# Patient Record
Sex: FEMALE | Race: WHITE | NOT HISPANIC OR LATINO | Employment: FULL TIME | URBAN - METROPOLITAN AREA
[De-identification: names, ages, dates, MRNs, and addresses within clinical notes are randomized per-mention and may not be internally consistent; named-entity substitution may affect disease eponyms.]

---

## 2017-01-15 ENCOUNTER — LAB CONVERSION - ENCOUNTER (OUTPATIENT)
Dept: OTHER | Facility: OTHER | Age: 27
End: 2017-01-15

## 2017-01-15 LAB — TSH SERPL DL<=0.05 MIU/L-ACNC: 1.71 MIU/L

## 2017-01-17 ENCOUNTER — ALLSCRIPTS OFFICE VISIT (OUTPATIENT)
Dept: OTHER | Facility: OTHER | Age: 27
End: 2017-01-17

## 2017-04-11 ENCOUNTER — ALLSCRIPTS OFFICE VISIT (OUTPATIENT)
Dept: OTHER | Facility: OTHER | Age: 27
End: 2017-04-11

## 2017-05-09 ENCOUNTER — ALLSCRIPTS OFFICE VISIT (OUTPATIENT)
Dept: OTHER | Facility: OTHER | Age: 27
End: 2017-05-09

## 2017-09-12 ENCOUNTER — ALLSCRIPTS OFFICE VISIT (OUTPATIENT)
Dept: OTHER | Facility: OTHER | Age: 27
End: 2017-09-12

## 2017-10-26 NOTE — PROGRESS NOTES
Assessment  1  Anxiety (300 00) (F41 9)   2  Upper respiratory infection (465 9) (J06 9)    Plan  Upper respiratory infection    · Cefuroxime Axetil 500 MG Oral Tablet; Take 1 tablet twice daily    Discussion/Summary    #1  Anxiety? improved on the 1 5 of zoloft daily  Continue and recheck in 4 months  upper respiratory infection with likely early sinusitis- ceftin 500 mg one twice daily for 10 days and continue OTC meds and increase in fluids  Chief Complaint  2 month follow up to anxiety and medication check  c/o chest and sinus congestion x 1 wk  kck      History of Present Illness  patient here today for her 2 month recheck of Zoloft use for her anxiety  he states that the increase to 1-1/2 tablets of Zoloft once daily has definitely made a difference in her anxiety level overall  She states that on occasion she'll take 2 instead of one she had a bad day or she no she is going out of the day depending on what her outlook schedule is like  She states that she is happy where se is now with thjority of 1-1/2 daily  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 and bacteria  She states that everyone at her workplace is sick and for the past week she has had a lot of head congestion which now is moving into her chest and she is having a nasal and productive cough with secretions of clear and yellow mucus  No fevers  She has been using DayQuil and NyQuil over-the-counter  She states that her throat is sore and that her ears are beginning to ache  She does get frequent ear infections  Review of Systems    Constitutional: feeling tired, but-- as noted in HPI  Eyes: No complaints of eye pain, no red eyes, no eyesight problems, no discharge, no dry eyes, no itching of eyes  ENT: earache,-- sore throat-- and-- nasal discharge, but-- as noted in HPI     Cardiovascular: No complaints of slow heart rate, no fast heart rate, no chest pain, no palpitations, no leg claudication, no lower extremity edema  Respiratory: cough-- and-- PND, but-- as noted in HPI  Gastrointestinal: No complaints of abdominal pain, no constipation, no nausea or vomiting, no diarrhea, no bloody stools  Genitourinary: No complaints of dysuria, no incontinence, no pelvic pain, no dysmenorrhea, no vaginal discharge or bleeding  Musculoskeletal: No complaints of arthralgias, no myalgias, no joint swelling or stiffness, no limb pain or swelling  Integumentary: No complaints of skin rash or lesions, no itching, no skin wounds, no breast pain or lump  Neurological: No complaints of headache, no confusion, no convulsions, no numbness, no dizziness or fainting, no tingling, no limb weakness, no difficulty walking  Psychiatric: Not suicidal, no sleep disturbance, no anxiety or depression, no change in personality, no emotional problems  Endocrine: No complaints of proptosis, no hot flashes, no muscle weakness, no deepening of the voice, no feelings of weakness  Hematologic/Lymphatic: No complaints of swollen glands, no swollen glands in the neck, does not bleed easily, does not bruise easily  ROS reviewed  Active Problems  1  Anxiety (300 00) (F41 9)   2  Birth Control Method - Oral Contraceptives    Past Medical History    The active problems and past medical history were reviewed and updated today  Surgical History    The surgical history was reviewed and updated today  Family History  Mother    1  Family history of hypertension (V17 49) (Z82 49)  Grandmother    2  Family history of malignant neoplasm of brain (V16 8) (Z80 8)  Uncle    3  Family history of Cancer, colon  Family History    4  No family history of mental disorder    The family history was reviewed and updated today         Social History   · Activities: Movies   · Activities: Reading   · Birth Control Method - Oral Contraceptives   · Employed   · Exercise: Walking   · Never a smoker   · No secondhand smoke exposure (V43 89) (Z78 9)   · Single  The social history was reviewed and updated today  Current Meds   1  Cryselle-28 0 3-30 MG-MCG Oral Tablet; take 1 tablet every day; Therapy: [id number] to (Ples [place])  Requested for: 07Tim8929; Last   Rx:[id number]; Status: ACTIVE - Renewal Denied Ordered   2  Flonase 50 MCG/ACT SUSP; USE 2 SPRAYS IN EACH NOSTRIL ONCE DAILY; Therapy: 44Uxp8809 to (Last Rx:12Apr2012)  Requested for: 88Rev0771 Ordered   3  ProAir  (90 Base) MCG/ACT Inhalation Aerosol Solution; Therapy: 73ZRK8801 to Recorded   4  Sertraline HCl - 25 MG Oral Tablet; take 1 5 tablets by mouth once daily; Therapy: 78Isf8136 to (Evaluate:12Oct2017)  Requested for: 99IUK3187; Last   Rx:[id number] Ordered    The medication list was reviewed and updated today  Allergies  1  Sudafed   2  Rondec LIQD    Vitals  Vital Signs    Recorded: 12Sep2017 05:59PM   Heart Rate 64   Systolic 021, LUE, Sitting   Diastolic 72, LUE, Sitting   Height 5 ft 2 5 in   Weight 344 lb 4 oz   BMI Calculated 61 96   BSA Calculated 2 42     Physical Exam    Constitutional   General appearance: No acute distress, well appearing and well nourished  Eyes   Conjunctiva and lids: No swelling, erythema or discharge  Ears, Nose, Mouth, and Throat   External inspection of ears and nose: Normal     Otoscopic examination: Tympanic membranes translucent with normal light reflex  Canals patent without erythema  Nasal mucosa, septum, and turbinates: Abnormal  -- edematous erythematous bilateral turbinate  Oropharynx: Abnormal  -- ild posterior pharynx erythema without exudate  Pulmonary   Respiratory effort: No increased work of breathing or signs of respiratory distress  Auscultation of lungs: Clear to auscultation  Cardiovascular   Auscultation of heart: Normal rate and rhythm, normal S1 and S2, without murmurs  Lymphatic   Palpation of lymph nodes in neck: No lymphadenopathy   -- tender bilateral anterior cervical nodes without lymphadenopathy     Musculoskeletal   Gait and station: Normal     Psychiatric   Mood and affect: Normal          Future Appointments    Date/Time Provider Specialty Site   01/16/2018 06:30 PM Jade Del Valle [facility]     Signatures   Electronically signed by : Alyssa Byrne [facility]; Sep 12 2017  6:17PM EST                       (Author)    Electronically signed by : Gavin Fink MD; Sep 14 2017  9:13AM EST                       (Author)

## 2018-01-12 VITALS
SYSTOLIC BLOOD PRESSURE: 128 MMHG | BODY MASS INDEX: 51.91 KG/M2 | HEIGHT: 63 IN | WEIGHT: 293 LBS | DIASTOLIC BLOOD PRESSURE: 70 MMHG | HEART RATE: 60 BPM

## 2018-01-13 VITALS
DIASTOLIC BLOOD PRESSURE: 70 MMHG | BODY MASS INDEX: 51.91 KG/M2 | WEIGHT: 293 LBS | HEART RATE: 64 BPM | SYSTOLIC BLOOD PRESSURE: 138 MMHG | HEIGHT: 63 IN

## 2018-01-13 VITALS
BODY MASS INDEX: 51.91 KG/M2 | WEIGHT: 293 LBS | HEIGHT: 63 IN | HEART RATE: 68 BPM | SYSTOLIC BLOOD PRESSURE: 142 MMHG | DIASTOLIC BLOOD PRESSURE: 84 MMHG

## 2018-01-13 VITALS
SYSTOLIC BLOOD PRESSURE: 124 MMHG | HEIGHT: 63 IN | HEART RATE: 64 BPM | DIASTOLIC BLOOD PRESSURE: 72 MMHG | WEIGHT: 293 LBS | BODY MASS INDEX: 51.91 KG/M2

## 2018-02-01 ENCOUNTER — OFFICE VISIT (OUTPATIENT)
Dept: OBGYN CLINIC | Facility: MEDICAL CENTER | Age: 28
End: 2018-02-01
Payer: COMMERCIAL

## 2018-02-01 VITALS
HEIGHT: 62 IN | BODY MASS INDEX: 53.92 KG/M2 | DIASTOLIC BLOOD PRESSURE: 86 MMHG | WEIGHT: 293 LBS | SYSTOLIC BLOOD PRESSURE: 132 MMHG

## 2018-02-01 DIAGNOSIS — N91.4 SECONDARY OLIGOMENORRHEA: ICD-10-CM

## 2018-02-01 DIAGNOSIS — Z12.4 ENCOUNTER FOR PAPANICOLAOU SMEAR FOR CERVICAL CANCER SCREENING: ICD-10-CM

## 2018-02-01 DIAGNOSIS — Z01.419 WELL WOMAN EXAM WITH ROUTINE GYNECOLOGICAL EXAM: Primary | ICD-10-CM

## 2018-02-01 PROCEDURE — 99385 PREV VISIT NEW AGE 18-39: CPT | Performed by: OBSTETRICS & GYNECOLOGY

## 2018-02-01 RX ORDER — ALBUTEROL SULFATE 90 UG/1
AEROSOL, METERED RESPIRATORY (INHALATION)
COMMUNITY
Start: 2016-12-13 | End: 2019-03-05

## 2018-02-01 RX ORDER — SERTRALINE HYDROCHLORIDE 25 MG/1
TABLET, FILM COATED ORAL
Refills: 3 | COMMUNITY
Start: 2017-11-14 | End: 2018-02-27

## 2018-02-01 NOTE — PROGRESS NOTES
ASSESSMENT & PLAN: Diagnoses and all orders for this visit:    Encounter for Papanicolaou smear for cervical cancer screening  -     Thinprep Pap Reflex HPV mRNA E6/E7    Other orders  -     sertraline (ZOLOFT) 25 mg tablet; TAKE 1 5 TABLETS BY MOUTH ONCE DAILY  -     albuterol (PROAIR HFA) 90 mcg/act inhaler; Inhale  -     norgestrel-ethinyl estradiol (CRYSELLE-28) 0 3 mg-30 mcg per tablet; Take 1 tablet by mouth daily         1  Routine well woman exam done today  2  Pap:  The patient's pap is not up to date  Pap was today  Current ASCCP Guidelines reviewed  3   STD testing was was not  4   Patient is not sure she had her Gardasil vaccination  Recommendations reviewed  Will check with mother and pediatrician  5  The following were reviewed in today's visit:   6  F/u in 1 year for annual exam or PRN     CC:  Annual Gynecologic Examination    HPI: Carleen Caraballo is a 33 y o  who presents for annual gynecologic examination  She has the following concerns:  Irregular cycles, LMP October; was on Cryselle in the past    Last pap: 2 years years ago     There is no problem list on file for this patient  History reviewed  No pertinent past medical history  History reviewed  No pertinent surgical history  Past OB/Gyn History:  Pt has menstrual issues  Oligomenorrhea  History of sexually transmitted infection No  History of abnormal pap smears  No     Patient  currently sexually active  The current method of family planning is OCP (estrogen/progesterone)       Family History   Problem Relation Age of Onset    No Known Problems Mother     No Known Problems Father     Brain cancer Maternal Grandmother     Colon cancer Maternal Uncle        Social History:  Social History     Social History    Marital status: Single     Spouse name: N/A    Number of children: N/A    Years of education: N/A     Occupational History    Not on file       Social History Main Topics    Smoking status: Never Smoker    Smokeless tobacco: Never Used    Alcohol use No    Drug use: No    Sexual activity: No     Other Topics Concern    Not on file     Social History Narrative    No narrative on file       Allergies   Allergen Reactions    Pseudoephedrine Shortness Of Breath and Throat Swelling    Pseudoephedrine-Guaifenesin Er Hyperactivity         Current Outpatient Prescriptions:     sertraline (ZOLOFT) 25 mg tablet, TAKE 1 5 TABLETS BY MOUTH ONCE DAILY, Disp: , Rfl: 3    albuterol (PROAIR HFA) 90 mcg/act inhaler, Inhale, Disp: , Rfl:     norgestrel-ethinyl estradiol (CRYSELLE-28) 0 3 mg-30 mcg per tablet, Take 1 tablet by mouth daily, Disp: , Rfl:       Review of Systems  Constitutional :no fever, feels well, no tiredness, no recent weight gain or loss  ENT: no ear ache, no loss of hearing, no nosebleeds or nasal discharge, no sore throat or hoarseness  Cardiovascular: no complaints of slow or fast heart beat, no chest pain, no palpitations, no leg claudication or lower extremity edema  Respiratory: no complaints of shortness of shortness of breath, no PEREZ  Breasts:no complaints of breast pain, breast lump, or nipple discharge  Gastrointestinal: no complaints of abdominal pain, constipation,nausea, vomiting, or diarrhea or bloody stools  Genitourinary : no complaints of dysuria, incontinence, pelvic pain, no dysmenorrhea, vaginal discharge or abnormal vaginal bleeding and as noted in HPI    Integumentary: no complaints of skin rash or lesion, itching or dry skin  Neurological: no complaints of headache, no confusion, no numbness or tingling, no dizziness or fainting      Physical Exam:   /86   Ht 5' 2" (1 575 m)   Wt (!) 152 kg (335 lb)   LMP 10/18/2017   BMI 61 27 kg/m²     General:   appears stated age, cooperative, alert,normal mood and affect, obese   Neck: Neck: normal, supple,trachea midline, no masses   Heart: regular rate and rhythm, S1, S2 normal, no murmur, click, rub or gallop   Lungs: clear to auscultation bilaterally   Breasts: Breast exam :normal, no dimpling or skin changes noted   Abdomen: soft, non-tender, without masses or organomegaly   Vulva: Normal , no lesiona   Vagina: normal , no lesions or dryness   Urethra: normal   Cervix: Normal, no palpable masses, pap done today    Uterus: Normal , non-tender,not enlarged,no palpable masses   Adnexa: Normal, non-tender without fullness or masses

## 2018-02-02 LAB
CLINICAL INFO: NORMAL
CYTO CVX: NORMAL
DATE PREVIOUS BX: NORMAL
LMP START DATE: NORMAL
SL AMB PREV. PAP:: NORMAL
SPECIMEN SOURCE CVX/VAG CYTO: NORMAL

## 2018-02-27 ENCOUNTER — OFFICE VISIT (OUTPATIENT)
Dept: FAMILY MEDICINE CLINIC | Facility: CLINIC | Age: 28
End: 2018-02-27
Payer: COMMERCIAL

## 2018-02-27 VITALS
BODY MASS INDEX: 61.6 KG/M2 | DIASTOLIC BLOOD PRESSURE: 80 MMHG | SYSTOLIC BLOOD PRESSURE: 126 MMHG | HEART RATE: 68 BPM | WEIGHT: 293 LBS

## 2018-02-27 DIAGNOSIS — F41.9 ANXIETY: Primary | ICD-10-CM

## 2018-02-27 PROCEDURE — 99214 OFFICE O/P EST MOD 30 MIN: CPT | Performed by: PHYSICIAN ASSISTANT

## 2018-02-27 NOTE — PROGRESS NOTES
Assessment/Plan:    Anxiety  Improved on now 50 mg once daily  Continue and recheck 6 months  Diagnoses and all orders for this visit:    Anxiety  -     sertraline (ZOLOFT) 50 mg tablet; Take 1 tablet (50 mg total) by mouth daily for 90 days          Subjective:   CC: 4 month follow up for chronic conditions  No concerns noted  oscar     Patient ID: Panchito Joaquin is a 33 y o  female  Panchito Joaquin is here for chronic conditions f/u including the diagnosis of Anxiety  (primary encounter diagnosis)   Pt  states they are taking all medications as directed without complaints or side effects  Pt  had labs done prior to today's visit which included Recent Results (from the past 672 hour(s))  -Thinprep Pap Reflex HPV mRNA E6/E7  Collection Time: 02/01/18  5:47 PM       Result                      Value             Ref Range           SL AMB CLINICAL INFORM*     None given                            SL AMB LMP:                 NONE GIVEN                            SL AMB PREV  PAP:           NONE GIVEN                            SL AMB PREV  BX:            NONE GIVEN                            Source                      Cervix                                SL AMB STATEMENT OF AD*                                           SL AMB INTERPRETATION/*                                       Negative for intraepithelial lesion or malignancy  SL AMB CYTOTECHNOLOGIS*                                        Pt has been taking 50 mg since October and it is working better than taking the 1 5 of 25 mg  Needs refills  Overall doing wonderful on the Zoloft  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 and has no complaints today  The following portions of the patient's history were reviewed and updated as appropriate: allergies, current medications, past family history, past medical history, past social history, past surgical history and problem list     Review of Systems   Constitutional: Negative  HENT: Negative  Eyes: Negative  Respiratory: Negative  Cardiovascular: Negative  Gastrointestinal: Negative  Endocrine: Negative  Genitourinary: Negative  Musculoskeletal: Negative  Skin: Negative  Allergic/Immunologic: Negative  Neurological: Negative  Hematological: Negative  Psychiatric/Behavioral: Negative  Objective:      Vitals:    02/27/18 1848   BP: 126/80   BP Location: Left arm   Patient Position: Sitting   Pulse: 68   Weight: (!) 153 kg (336 lb 12 8 oz)            Physical Exam   Constitutional: She is oriented to person, place, and time  She appears well-developed and well-nourished  No distress  HENT:   Head: Normocephalic and atraumatic  Eyes: Conjunctivae are normal  Right eye exhibits no discharge  Left eye exhibits no discharge  Neck: Neck supple  Cardiovascular: Normal rate, regular rhythm and normal heart sounds  Exam reveals no gallop and no friction rub  No murmur heard  Pulmonary/Chest: Effort normal and breath sounds normal  No respiratory distress  She has no wheezes  She has no rales  Neurological: She is alert and oriented to person, place, and time  Skin: Skin is warm and dry  She is not diaphoretic  Psychiatric: She has a normal mood and affect  Judgment normal    Nursing note and vitals reviewed

## 2018-08-28 ENCOUNTER — OFFICE VISIT (OUTPATIENT)
Dept: FAMILY MEDICINE CLINIC | Facility: CLINIC | Age: 28
End: 2018-08-28
Payer: COMMERCIAL

## 2018-08-28 VITALS — HEART RATE: 92 BPM | DIASTOLIC BLOOD PRESSURE: 70 MMHG | SYSTOLIC BLOOD PRESSURE: 138 MMHG

## 2018-08-28 DIAGNOSIS — F41.9 ANXIETY: Primary | ICD-10-CM

## 2018-08-28 PROCEDURE — 1036F TOBACCO NON-USER: CPT | Performed by: PHYSICIAN ASSISTANT

## 2018-08-28 PROCEDURE — 99213 OFFICE O/P EST LOW 20 MIN: CPT | Performed by: PHYSICIAN ASSISTANT

## 2018-08-28 NOTE — PATIENT INSTRUCTIONS
Problem List Items Addressed This Visit        Other    Anxiety - Primary     Very stable on current zoloft dose  Will continue and pt is to call when she needs refills  Recheck in 6 months

## 2018-08-28 NOTE — ASSESSMENT & PLAN NOTE
Very stable on current zoloft dose  Will continue and pt is to call when she needs refills  Recheck in 6 months

## 2018-08-28 NOTE — PROGRESS NOTES
Assessment/Plan:    Anxiety  Very stable on current zoloft dose  Will continue and pt is to call when she needs refills  Recheck in 6 months  Diagnoses and all orders for this visit:    Anxiety          Subjective:   CC: 6 month follow up for chronic conditions  No concerns noted  Patient ID: Fallon Chaidez is a 33 y o  female  Fallon Chaidez is here for chronic conditions f/u including the diagnosis of Anxiety  (primary encounter diagnosis)   Pt  states they are taking all medications as directed without complaints or side effects  She thinks the zoloft 50 mg has been working great for the past year and does not feel she needs any adjustments at this time  She continues to work at [employer] which is picking up for the fall  She is in the process of moving where she lives and needs to be out by the end of the week and it is very hot out so she is stressed more than usual but feels the med is doing its job  No SI/HI  The following portions of the patient's history were reviewed and updated as appropriate: allergies, current medications, past family history, past medical history, past social history, past surgical history and problem list     Review of Systems   Constitutional: Negative  HENT: Negative  Eyes: Negative  Respiratory: Negative  Cardiovascular: Negative  Gastrointestinal: Negative  Endocrine: Negative  Genitourinary: Negative  Musculoskeletal: Negative  Skin: Negative  Allergic/Immunologic: Negative  Neurological: Negative  Hematological: Negative  Psychiatric/Behavioral: Negative  Objective:      Vitals:    08/28/18 1835   BP: 138/70   BP Location: Left arm   Patient Position: Sitting   Pulse: 92            Physical Exam   Constitutional: She is oriented to person, place, and time  She appears well-developed and well-nourished  No distress  HENT:   Head: Normocephalic and atraumatic     Eyes: Conjunctivae are normal  Right eye exhibits no discharge  Left eye exhibits no discharge  Neck: Neck supple  Carotid bruit is not present  Cardiovascular: Normal rate, regular rhythm and normal heart sounds  Exam reveals no gallop and no friction rub  No murmur heard  Pulmonary/Chest: Effort normal and breath sounds normal  No respiratory distress  She has no wheezes  She has no rales  Neurological: She is alert and oriented to person, place, and time  Skin: Skin is warm and dry  She is not diaphoretic  Psychiatric: She has a normal mood and affect  Judgment normal    Nursing note and vitals reviewed

## 2018-10-10 DIAGNOSIS — N91.4 SECONDARY OLIGOMENORRHEA: ICD-10-CM

## 2018-10-10 RX ORDER — NORGESTREL-ETHINYL ESTRADIOL 0.3-0.03MG
TABLET ORAL
Qty: 28 TABLET | Refills: 11 | Status: SHIPPED | OUTPATIENT
Start: 2018-10-10 | End: 2019-11-13 | Stop reason: SDUPTHER

## 2018-11-19 ENCOUNTER — TELEPHONE (OUTPATIENT)
Dept: FAMILY MEDICINE CLINIC | Facility: CLINIC | Age: 28
End: 2018-11-19

## 2018-11-19 NOTE — TELEPHONE ENCOUNTER
----- Message from [address] [place] [address]  Yunior sent at 11/19/2018 10:10 AM EST -----  Regarding: RE: Prescription Question  Contact: [phone]  I have enough tablets right now to work off of but if I do I can reach out  Thank you very much for your promptness!    ----- Message -----  From: Celeste Epley, PA-C  Sent: 11/19/18, 7:52 AM  To: [address] [place] [address]  Yunior  Subject: RE: Prescription Question    Good Morning,  That would be fine to do  Do you need me to refill with the new directions to give you 45 tabs at once? Carmen Calixto      ----- Message -----     From: [address] [place] [address]  Yunior     Sent: 11/17/2018  8:22 PM EST       To: Celeste Epley, PA-C  Subject: Prescription Question    Ms Chino Anderson was wondering if it would be ok to increase my current script for Zoloft from 50mg once daily to 1 5 tablets daily for a total of 75mg daily? Holiday season and busier work weeks have kicked in and I'm finding myself slightly more anxious than I have been  I didn't want to increase myself without your approval or if you know something else that could help me I would be appreciative  Any help with this matter would be greatly appreciated      Lujean Cogan

## 2018-12-13 DIAGNOSIS — F41.9 ANXIETY: ICD-10-CM

## 2019-03-05 ENCOUNTER — OFFICE VISIT (OUTPATIENT)
Dept: FAMILY MEDICINE CLINIC | Facility: CLINIC | Age: 29
End: 2019-03-05
Payer: COMMERCIAL

## 2019-03-05 VITALS
SYSTOLIC BLOOD PRESSURE: 132 MMHG | HEIGHT: 63 IN | WEIGHT: 293 LBS | BODY MASS INDEX: 51.91 KG/M2 | DIASTOLIC BLOOD PRESSURE: 90 MMHG | HEART RATE: 72 BPM

## 2019-03-05 DIAGNOSIS — E66.01 MORBID OBESITY (HCC): ICD-10-CM

## 2019-03-05 DIAGNOSIS — F41.9 ANXIETY: Primary | ICD-10-CM

## 2019-03-05 PROBLEM — J30.1 NON-SEASONAL ALLERGIC RHINITIS DUE TO POLLEN: Status: ACTIVE | Noted: 2019-03-05

## 2019-03-05 PROCEDURE — 1036F TOBACCO NON-USER: CPT | Performed by: PHYSICIAN ASSISTANT

## 2019-03-05 PROCEDURE — 3008F BODY MASS INDEX DOCD: CPT | Performed by: PHYSICIAN ASSISTANT

## 2019-03-05 PROCEDURE — 99213 OFFICE O/P EST LOW 20 MIN: CPT | Performed by: PHYSICIAN ASSISTANT

## 2019-03-05 RX ORDER — FEXOFENADINE HCL 180 MG/1
TABLET ORAL
COMMUNITY
Start: 2018-01-01 | End: 2021-08-19

## 2019-03-06 NOTE — PATIENT INSTRUCTIONS
Problem List Items Addressed This Visit        Other    Anxiety - Primary     Controlled  Continue current medications  Morbid obesity ([facility] [address] )     Check fasting labs  Call with results  Relevant Orders    Lipid panel    Comprehensive metabolic panel    CBC and differential    TSH, 3rd generation with Free T4 reflex          Weight Management   AMBULATORY CARE:   Why it is important to manage your weight:  Being overweight increases your risk of health conditions such as heart disease, high blood pressure, type 2 diabetes, and certain types of cancer  It can also increase your risk for osteoarthritis, sleep apnea, and other respiratory problems  Aim for a slow, steady weight loss  Even a small amount of weight loss can lower your risk of health problems  How to lose weight safely:  A safe and healthy way to lose weight is to eat fewer calories and get regular exercise  You can lose up about 1 pound a week by decreasing the number of calories you eat by 500 calories each day  You can decrease calories by eating smaller portion sizes or by cutting out high-calorie foods  Read labels to find out how many calories are in the foods you eat  You can also burn calories with exercise such as walking, swimming, or biking  You will be more likely to keep weight off if you make these changes part of your lifestyle  Healthy meal plan for weight management:  A healthy meal plan includes a variety of foods, contains fewer calories, and helps you stay healthy  A healthy meal plan includes the following:  · Eat whole-grain foods more often  A healthy meal plan should contain fiber  Fiber is the part of grains, fruits, and vegetables that is not broken down by your body  Whole-grain foods are healthy and provide extra fiber in your diet  Some examples of whole-grain foods are whole-wheat breads and pastas, oatmeal, brown rice, and bulgur  · Eat a variety of vegetables every day    Include dark, leafy greens such as spinach, kale, juanis greens, and mustard greens  Eat yellow and orange vegetables such as carrots, sweet potatoes, and winter squash  · Eat a variety of fruits every day  Choose fresh or canned fruit (canned in its own juice or light syrup) instead of juice  Fruit juice has very little or no fiber  · Eat low-fat dairy foods  Drink fat-free (skim) milk or 1% milk  Eat fat-free yogurt and low-fat cottage cheese  Try low-fat cheeses such as mozzarella and other reduced-fat cheeses  · Choose meat and other protein foods that are low in fat  Choose beans or other legumes such as split peas or lentils  Choose fish, skinless poultry (chicken or turkey), or lean cuts of red meat (beef or pork)  Before you cook meat or poultry, cut off any visible fat  · Use less fat and oil  Try baking foods instead of frying them  Add less fat, such as margarine, sour cream, regular salad dressing and mayonnaise to foods  Eat fewer high-fat foods  Some examples of high-fat foods include french fries, doughnuts, ice cream, and cakes  · Eat fewer sweets  Limit foods and drinks that are high in sugar  This includes candy, cookies, regular soda, and sweetened drinks  Ways to decrease calories:   · Eat smaller portions  ¨ Use a small plate with smaller servings  ¨ Do not eat second helpings  ¨ When you eat at a restaurant, ask for a box and place half of your meal in the box before you eat  ¨ Share an entrée with someone else  · Replace high-calorie snacks with healthy, low-calorie snacks  ¨ Choose fresh fruit, vegetables, fat-free rice cakes, or air-popped popcorn instead of potato chips, nuts, or chocolate  ¨ Choose water or calorie-free drinks instead of soda or sweetened drinks  · Eat regular meals  Skipping meals can lead to overeating later in the day   Eat a healthy snack in place of a meal if you do not have time to eat a regular meal      · Do not shop for groceries when you are hungry  You may be more likely to make unhealthy food choices  Take a grocery list of healthy foods and shop after you have eaten  Exercise:  Exercise at least 30 minutes per day on most days of the week  Some examples of exercise include walking, biking, dancing, and swimming  You can also fit in more physical activity by taking the stairs instead of the elevator or parking farther away from stores  Ask your healthcare provider about the best exercise plan for you  Other things to consider as you try to lose weight:   · Be aware of situations that may give you the urge to overeat, such as eating while watching television  Find ways to avoid these situations  For example, read a book, go for a walk, or do crafts  · Meet with a weight loss support group or friends who are also trying to lose weight  This may help you stay motivated to continue working on your weight loss goals  © 2017 [employer] Information is for End User's use only and may not be sold, redistributed or otherwise used for commercial purposes  All illustrations and images included in CareNotes® are the copyrighted property of [employer]  or [employer]  The above information is an  only  It is not intended as medical advice for individual conditions or treatments  Talk to your doctor, nurse or pharmacist before following any medical regimen to see if it is safe and effective for you

## 2019-03-06 NOTE — PROGRESS NOTES
Assessment and Plan:    Problem List Items Addressed This Visit        Other    Anxiety - Primary     Controlled  Continue current medications  Morbid obesity ([facility] )     Check fasting labs  Call with results  Relevant Orders    Lipid panel    Comprehensive metabolic panel    CBC and differential    TSH, 3rd generation with Free T4 reflex             Diagnoses and all orders for this visit:    Anxiety    Morbid obesity ([facility] [address] )  -     Lipid panel  -     Comprehensive metabolic panel  -     CBC and differential  -     TSH, 3rd generation with Free T4 reflex    Other orders  -     fexofenadine (ALLEGRA ALLERGY) 180 MG tablet              Subjective:      Patient ID: Jak Rm is a 33 y o  female  CC:    Chief Complaint   Patient presents with    Follow-up     for chronic conditions, no concerns noted  HPI:      Jak Rm is here for chronic conditions f/u including the diagnosis of Anxiety  (primary encounter diagnosis)   Pt  states they are taking all medications as directed without complaints or side effects  In Nov  Increased zoloft to 1 5 of 50 mg and has been dong great since  No SI or HI  Never had fasting labs done  The following portions of the patient's history were reviewed and updated as appropriate: allergies, current medications, past family history, past medical history, past social history, past surgical history and problem list       Review of Systems   Constitutional: Negative  HENT: Negative  Eyes: Negative  Respiratory: Negative  Cardiovascular: Negative  Gastrointestinal: Negative  Endocrine: Negative  Genitourinary: Negative  Musculoskeletal: Negative  Skin: Negative  Allergic/Immunologic: Negative  Neurological: Negative  Hematological: Negative  Psychiatric/Behavioral: Negative            Data to review:       Objective:    Vitals:    03/05/19 1911   BP: 132/90   BP Location: Left arm   Patient Position: Sitting Pulse: 72   Weight: (!) 147 kg (323 lb 6 4 oz)   Height: 5' 2 5" (1 588 m)        Physical Exam   Constitutional: She is oriented to person, place, and time  She appears well-developed and well-nourished  No distress  HENT:   Head: Normocephalic and atraumatic  Eyes: Conjunctivae are normal  Right eye exhibits no discharge  Left eye exhibits no discharge  Neck: Neck supple  Carotid bruit is not present  Cardiovascular: Normal rate and regular rhythm  Pulmonary/Chest: Effort normal  No respiratory distress  Neurological: She is alert and oriented to person, place, and time  Skin: Skin is warm and dry  She is not diaphoretic  Psychiatric: She has a normal mood and affect  Judgment normal    Nursing note and vitals reviewed  BMI Counseling: Body mass index is 58 21 kg/m²  Discussed the patient's BMI with her  The BMI is above average  BMI counseling and education was provided to the patient  Nutrition recommendations include reducing portion sizes, decreasing overall calorie intake, 3-5 servings of fruits/vegetables daily, reducing fast food intake, consuming healthier snacks, decreasing soda and/or juice intake, moderation in carbohydrate intake, increasing intake of lean protein, reducing intake of saturated fat and trans fat and reducing intake of cholesterol

## 2019-05-07 DIAGNOSIS — F40.243 ANXIETY WITH FLYING: Primary | ICD-10-CM

## 2019-05-07 RX ORDER — ALPRAZOLAM 0.25 MG/1
TABLET ORAL
Qty: 10 TABLET | Refills: 0 | Status: SHIPPED | OUTPATIENT
Start: 2019-05-07 | End: 2020-03-20 | Stop reason: SDUPTHER

## 2019-05-16 ENCOUNTER — OFFICE VISIT (OUTPATIENT)
Dept: FAMILY MEDICINE CLINIC | Facility: CLINIC | Age: 29
End: 2019-05-16
Payer: COMMERCIAL

## 2019-05-16 VITALS
HEIGHT: 63 IN | SYSTOLIC BLOOD PRESSURE: 124 MMHG | WEIGHT: 293 LBS | TEMPERATURE: 98.8 F | BODY MASS INDEX: 51.91 KG/M2 | HEART RATE: 88 BPM | DIASTOLIC BLOOD PRESSURE: 80 MMHG

## 2019-05-16 DIAGNOSIS — J06.9 ACUTE UPPER RESPIRATORY INFECTION: Primary | ICD-10-CM

## 2019-05-16 PROCEDURE — 3008F BODY MASS INDEX DOCD: CPT | Performed by: PHYSICIAN ASSISTANT

## 2019-05-16 PROCEDURE — 99214 OFFICE O/P EST MOD 30 MIN: CPT | Performed by: PHYSICIAN ASSISTANT

## 2019-05-16 PROCEDURE — 1036F TOBACCO NON-USER: CPT | Performed by: PHYSICIAN ASSISTANT

## 2019-05-16 RX ORDER — CEFUROXIME AXETIL 500 MG/1
500 TABLET ORAL EVERY 12 HOURS SCHEDULED
Qty: 20 TABLET | Refills: 0 | Status: SHIPPED | OUTPATIENT
Start: 2019-05-16 | End: 2019-05-26

## 2019-05-16 RX ORDER — DEXTROMETHORPHAN HYDROBROMIDE AND PROMETHAZINE HYDROCHLORIDE 15; 6.25 MG/5ML; MG/5ML
5 SYRUP ORAL 4 TIMES DAILY PRN
Qty: 118 ML | Refills: 0 | Status: SHIPPED | OUTPATIENT
Start: 2019-05-16 | End: 2019-05-26

## 2019-06-02 DIAGNOSIS — F41.9 ANXIETY: ICD-10-CM

## 2019-10-27 DIAGNOSIS — N91.4 SECONDARY OLIGOMENORRHEA: ICD-10-CM

## 2019-10-28 RX ORDER — NORGESTREL-ETHINYL ESTRADIOL 0.3-0.03MG
TABLET ORAL
Qty: 84 TABLET | Refills: 3 | OUTPATIENT
Start: 2019-10-28

## 2019-11-13 DIAGNOSIS — N91.4 SECONDARY OLIGOMENORRHEA: ICD-10-CM

## 2019-12-03 ENCOUNTER — APPOINTMENT (OUTPATIENT)
Dept: LAB | Facility: MEDICAL CENTER | Age: 29
End: 2019-12-03
Payer: COMMERCIAL

## 2019-12-03 ENCOUNTER — OFFICE VISIT (OUTPATIENT)
Dept: FAMILY MEDICINE CLINIC | Facility: CLINIC | Age: 29
End: 2019-12-03
Payer: COMMERCIAL

## 2019-12-03 ENCOUNTER — ANNUAL EXAM (OUTPATIENT)
Dept: OBGYN CLINIC | Facility: MEDICAL CENTER | Age: 29
End: 2019-12-03
Payer: COMMERCIAL

## 2019-12-03 VITALS
WEIGHT: 293 LBS | HEART RATE: 80 BPM | HEIGHT: 63 IN | SYSTOLIC BLOOD PRESSURE: 132 MMHG | BODY MASS INDEX: 51.91 KG/M2 | DIASTOLIC BLOOD PRESSURE: 76 MMHG

## 2019-12-03 VITALS — SYSTOLIC BLOOD PRESSURE: 130 MMHG | DIASTOLIC BLOOD PRESSURE: 70 MMHG | BODY MASS INDEX: 59.85 KG/M2 | WEIGHT: 293 LBS

## 2019-12-03 DIAGNOSIS — Z01.419 ENCOUNTER FOR WELL WOMAN EXAM WITH ROUTINE GYNECOLOGICAL EXAM: Primary | ICD-10-CM

## 2019-12-03 DIAGNOSIS — J30.1 NON-SEASONAL ALLERGIC RHINITIS DUE TO POLLEN: ICD-10-CM

## 2019-12-03 DIAGNOSIS — E66.01 MORBID OBESITY (HCC): ICD-10-CM

## 2019-12-03 DIAGNOSIS — F41.9 ANXIETY: Primary | ICD-10-CM

## 2019-12-03 DIAGNOSIS — E78.2 MIXED HYPERLIPIDEMIA: ICD-10-CM

## 2019-12-03 DIAGNOSIS — N91.4 SECONDARY OLIGOMENORRHEA: ICD-10-CM

## 2019-12-03 PROBLEM — J06.9 ACUTE UPPER RESPIRATORY INFECTION: Status: RESOLVED | Noted: 2019-05-16 | Resolved: 2019-12-03

## 2019-12-03 LAB
ALBUMIN SERPL BCP-MCNC: 3.6 G/DL (ref 3.5–5)
ALP SERPL-CCNC: 96 U/L (ref 46–116)
ALT SERPL W P-5'-P-CCNC: 19 U/L (ref 12–78)
ANION GAP SERPL CALCULATED.3IONS-SCNC: 8 MMOL/L (ref 4–13)
AST SERPL W P-5'-P-CCNC: 18 U/L (ref 5–45)
BILIRUB SERPL-MCNC: 0.36 MG/DL (ref 0.2–1)
BUN SERPL-MCNC: 13 MG/DL (ref 5–25)
CALCIUM SERPL-MCNC: 9.5 MG/DL (ref 8.3–10.1)
CHLORIDE SERPL-SCNC: 107 MMOL/L (ref 100–108)
CHOLEST SERPL-MCNC: 196 MG/DL (ref 50–200)
CO2 SERPL-SCNC: 21 MMOL/L (ref 21–32)
CREAT SERPL-MCNC: 0.7 MG/DL (ref 0.6–1.3)
GFR SERPL CREATININE-BSD FRML MDRD: 118 ML/MIN/1.73SQ M
GLUCOSE P FAST SERPL-MCNC: 84 MG/DL (ref 65–99)
HDLC SERPL-MCNC: 47 MG/DL
LDLC SERPL CALC-MCNC: 117 MG/DL (ref 0–100)
NONHDLC SERPL-MCNC: 149 MG/DL
POTASSIUM SERPL-SCNC: 5 MMOL/L (ref 3.5–5.3)
PROT SERPL-MCNC: 8.1 G/DL (ref 6.4–8.2)
SODIUM SERPL-SCNC: 136 MMOL/L (ref 136–145)
TRIGL SERPL-MCNC: 161 MG/DL
TSH SERPL DL<=0.05 MIU/L-ACNC: 3.19 UIU/ML (ref 0.36–3.74)

## 2019-12-03 PROCEDURE — 99214 OFFICE O/P EST MOD 30 MIN: CPT | Performed by: PHYSICIAN ASSISTANT

## 2019-12-03 PROCEDURE — 36415 COLL VENOUS BLD VENIPUNCTURE: CPT | Performed by: PHYSICIAN ASSISTANT

## 2019-12-03 PROCEDURE — 80061 LIPID PANEL: CPT | Performed by: PHYSICIAN ASSISTANT

## 2019-12-03 PROCEDURE — 99395 PREV VISIT EST AGE 18-39: CPT | Performed by: OBSTETRICS & GYNECOLOGY

## 2019-12-03 PROCEDURE — 1036F TOBACCO NON-USER: CPT | Performed by: PHYSICIAN ASSISTANT

## 2019-12-03 PROCEDURE — 3008F BODY MASS INDEX DOCD: CPT | Performed by: PHYSICIAN ASSISTANT

## 2019-12-03 PROCEDURE — 84443 ASSAY THYROID STIM HORMONE: CPT | Performed by: PHYSICIAN ASSISTANT

## 2019-12-03 PROCEDURE — 80053 COMPREHEN METABOLIC PANEL: CPT | Performed by: PHYSICIAN ASSISTANT

## 2019-12-03 RX ORDER — SERTRALINE HYDROCHLORIDE 100 MG/1
100 TABLET, FILM COATED ORAL DAILY
Qty: 90 TABLET | Refills: 3 | Status: SHIPPED | OUTPATIENT
Start: 2019-12-03 | End: 2021-01-02

## 2019-12-03 NOTE — PATIENT INSTRUCTIONS
Thank you for your confidence in our team    We appreciate you and welcome your feedback  If you receive a survey from us, please take a few moments to let us know how we are doing     Sincerely,   Yolande Seymour MD

## 2019-12-03 NOTE — PROGRESS NOTES
ASSESSMENT & PLAN: Diagnoses and all orders for this visit:    Encounter for well woman exam with routine gynecological exam    Secondary oligomenorrhea  -     norgestrel-ethinyl estradiol (CRYSELLE-28) 0 3 mg-30 mcg per tablet; Take 1 tablet by mouth daily         1  Routine well woman exam done today  2  Pap:  The patient's pap is not up to date  Pap was not done today  Current ASCCP Guidelines reviewed  Due 2021  3  STD testing was was not done   4  Contraception: OCPs, refill sent   6  F/u in 1 year for annual exam or PRN     CC:  Annual Gynecologic Examination    HPI: Viola Vu is a 30 y o  who presents for annual gynecologic examination  She has the following concerns:  No GYN complaints; doing well with OCPs    Patient Active Problem List   Diagnosis    Anxiety    Non-seasonal allergic rhinitis due to pollen    Morbid obesity ([address] )    Acute upper respiratory infection       History reviewed  No pertinent past medical history  History reviewed  No pertinent surgical history  Past OB/Gyn History:  Pt has menstrual issues  Oligomenorrhea  History of sexually transmitted infection No  History of abnormal pap smears  No     Patient  currently sexually active  The current method of family planning is OCP (estrogen/progesterone)       Family History   Problem Relation Age of Onset    Hypertension Mother     No Known Problems Father     Brain cancer Maternal Grandmother     Colon cancer Maternal Uncle        Social History:  Social History     Socioeconomic History    Marital status: Single     Spouse name: Not on file    Number of children: Not on file    Years of education: Not on file    Highest education level: Not on file   Occupational History    Not on file   Social Needs    Financial resource strain: Not on file    Food insecurity:     Worry: Not on file     Inability: Not on file    Transportation needs:     Medical: Not on file     Non-medical: Not on file   Tobacco Use  Smoking status: Never Smoker    Smokeless tobacco: Never Used   Substance and Sexual Activity    Alcohol use: Yes     Frequency: 2-4 times a month    Drug use: Never    Sexual activity: Not Currently     Birth control/protection: OCP   Lifestyle    Physical activity:     Days per week: Not on file     Minutes per session: Not on file    Stress: Not on file   Relationships    Social connections:     Talks on phone: Not on file     Gets together: Not on file     Attends Taoist service: Not on file     Active member of club or organization: Not on file     Attends meetings of clubs or organizations: Not on file     Relationship status: Not on file    Intimate partner violence:     Fear of current or ex partner: Not on file     Emotionally abused: Not on file     Physically abused: Not on file     Forced sexual activity: Not on file   Other Topics Concern    Not on file   Social History Narrative    Not on file       Allergies   Allergen Reactions    Pseudoephedrine Shortness Of Breath and Throat Swelling    Pseudoephedrine-Guaifenesin Er Hyperactivity    Rondec-D [Chlophedianol-Pseudoephedrine]      Cough syrup;hyperactive         Current Outpatient Medications:     ALPRAZolam (XANAX) 0 25 mg tablet, 1/2 to 2 tabs as needed for travel anxiety  , Disp: 10 tablet, Rfl: 0    fexofenadine (ALLEGRA ALLERGY) 180 MG tablet, , Disp: , Rfl:     norgestrel-ethinyl estradiol (CRYSELLE-28) 0 3 mg-30 mcg per tablet, Take 1 tablet by mouth daily, Disp: 28 tablet, Rfl: 11    sertraline (ZOLOFT) 50 mg tablet, TAKE 1 5 TABLETS BY MOUTH DAILY, Disp: 135 tablet, Rfl: 1      Review of Systems  Constitutional :no fever, feels well, no tiredness, no recent weight gain or loss  ENT: no ear ache, no loss of hearing, no nosebleeds or nasal discharge, no sore throat or hoarseness    Cardiovascular: no complaints of slow or fast heart beat, no chest pain, no palpitations, no leg claudication or lower extremity edema  Respiratory: no complaints of shortness of shortness of breath, no PEREZ  Breasts:no complaints of breast pain, breast lump, or nipple discharge  Gastrointestinal: no complaints of abdominal pain, constipation,nausea, vomiting, or diarrhea or bloody stools  Genitourinary : no complaints of dysuria, incontinence, pelvic pain, no dysmenorrhea, vaginal discharge or abnormal vaginal bleeding and as noted in HPI    Integumentary: no complaints of skin rash or lesion, itching or dry skin  Neurological: no complaints of headache, no confusion, no numbness or tingling, no dizziness or fainting      Physical Exam:   /70   Wt (!) 151 kg (332 lb 8 oz)   LMP 11/22/2019 (Exact Date)   BMI 59 85 kg/m²     General:   appears stated age, cooperative, alert,normal mood and affect, obese   Neck: Neck: normal, supple,trachea midline, no masses   Heart: regular rate and rhythm, S1, S2 normal, no murmur, click, rub or gallop   Lungs: clear to auscultation bilaterally   Breasts: Breast exam :normal, no dimpling or skin changes noted   Abdomen: soft, non-tender, without masses or organomegaly   Vulva: Normal , no lesiona   Vagina: normal , no lesions or dryness   Urethra: normal   Cervix: Normal, no palpable masses,    Uterus: Normal , non-tender,not enlarged,no palpable masses   Adnexa: Normal, non-tender without fullness or masses

## 2019-12-03 NOTE — PROGRESS NOTES
Assessment and Plan:    Problem List Items Addressed This Visit        Respiratory    Non-seasonal allergic rhinitis due to pollen     Pt requesting to see an allergist for testing  Order placed  Relevant Orders    Ambulatory referral to Allergy       Other    Anxiety - Primary     Pt increased Zoloft to 100 mg in August and doing better  Will change to 100 mg tabs once daily  Refer to [facility]  Relevant Medications    sertraline (ZOLOFT) 100 mg tablet    Other Relevant Orders    Ambulatory referral to Social Work    Morbid obesity ([facility] )     Pt  is up 9 pounds since May  She has a gym membership and will try to commit to going at least 2 times a week  Recheck in 6 months  Relevant Orders    Ambulatory referral to Social Work    Mixed hyperlipidemia     Trigs slightly elevated at 161, LDL OK at 117  Diagnoses and all orders for this visit:    Anxiety  -     sertraline (ZOLOFT) 100 mg tablet; Take 1 tablet (100 mg total) by mouth daily  -     Ambulatory referral to Social Work; Future    Mixed hyperlipidemia    Morbid obesity ([facility] )  -     Ambulatory referral to Social Work; Future    Non-seasonal allergic rhinitis due to pollen  -     Ambulatory referral to Allergy; Future              Subjective:      Patient ID: Jesse Collier is a 30 y o  female  CC:    Chief Complaint   Patient presents with    Follow-up     patient is here for a 6 month f/u re: AR, anxiety, obesity, hyperlipidemia  Patient needs to review blood work results  ak       HPI:      Jesse Collier is here for chronic conditions f/u including the diagnosis of Anxiety  (primary encounter diagnosis)  Mixed hyperlipidemia   Pt  states they are taking all medications as directed without complaints or side effects   Pt  had labs done prior to today's visit which included Recent Results (from the past 672 hour(s))  -Lipid panel  Collection Time: 12/03/19  8:47 AM       Result                      Value Ref Range           Cholesterol                 196               50 - 200 mg/*       Triglycerides               161 (H)           <=150 mg/dL         HDL, Direct                 47                >=40 mg/dL          LDL Calculated              117 (H)           0 - 100 mg/dL       Non-HDL-Chol (CHOL-HDL)     149               mg/dl          -Comprehensive metabolic panel  Collection Time: 12/03/19  8:47 AM       Result                      Value             Ref Range           Sodium                      136               136 - 145 mm*       Potassium                   5 0               3 5 - 5 3 mm*       Chloride                    107               100 - 108 mm*       CO2                         21                21 - 32 mmol*       ANION GAP                   8                 4 - 13 mmol/L       BUN                         13                5 - 25 mg/dL        Creatinine                  0 70              0 60 - 1 30 *       Glucose, Fasting            84                65 - 99 mg/dL       Calcium                     9 5               8 3 - 10 1 m*       AST                         18                5 - 45 U/L          ALT                         19                12 - 78 U/L         Alkaline Phosphatase        96                46 - 116 U/L        Total Protein               8 1               6 4 - 8 2 g/*       Albumin                     3 6               3 5 - 5 0 g/*       Total Bilirubin             0 36              0 20 - 1 00 *       eGFR                        118               ml/min/1 73s*  -TSH, 3rd generation with Free T4 reflex  Collection Time: 12/03/19  8:47 AM       Result                      Value             Ref Range           TSH 3RD GENERATON           3 190             0 358 - 3 74*        The following portions of the patient's history were reviewed and updated as appropriate: allergies, current medications, past family history, past medical history, past social history, past surgical history and problem list       Review of Systems   Constitutional: Negative  HENT: Negative  Eyes: Negative  Respiratory: Negative  Cardiovascular: Negative  Gastrointestinal: Negative  Endocrine: Negative  Genitourinary: Negative  Musculoskeletal: Negative  Skin: Negative  Allergic/Immunologic: Negative  Neurological: Negative  Hematological: Negative  Psychiatric/Behavioral: Negative  Data to review:       Objective:    Vitals:    12/03/19 1846   BP: 132/76   Pulse: 80   Weight: (!) 152 kg (334 lb)   Height: 5' 2 5" (1 588 m)        Physical Exam   Constitutional: She is oriented to person, place, and time  She appears well-developed and well-nourished  No distress  HENT:   Head: Normocephalic and atraumatic  Eyes: Conjunctivae are normal  Right eye exhibits no discharge  Left eye exhibits no discharge  Neck: Neck supple  Carotid bruit is not present  Cardiovascular: Normal rate, regular rhythm and normal heart sounds  Exam reveals no gallop and no friction rub  No murmur heard  Pulmonary/Chest: Effort normal and breath sounds normal  No respiratory distress  She has no wheezes  She has no rales  Neurological: She is alert and oriented to person, place, and time  Skin: Skin is warm and dry  She is not diaphoretic  Psychiatric: She has a normal mood and affect  Judgment normal    Nursing note and vitals reviewed

## 2019-12-04 ENCOUNTER — TRANSCRIBE ORDERS (OUTPATIENT)
Dept: LAB | Facility: HOSPITAL | Age: 29
End: 2019-12-04

## 2019-12-04 DIAGNOSIS — E66.01 MORBID OBESITY (HCC): Primary | ICD-10-CM

## 2019-12-04 NOTE — ASSESSMENT & PLAN NOTE
Pt increased Zoloft to 100 mg in August and doing better  Will change to 100 mg tabs once daily  Refer to [employer]

## 2019-12-04 NOTE — ASSESSMENT & PLAN NOTE
Pt  is up 9 pounds since May  She has a gym membership and will try to commit to going at least 2 times a week  Recheck in 6 months

## 2019-12-04 NOTE — PATIENT INSTRUCTIONS
Problem List Items Addressed This Visit        Respiratory    Non-seasonal allergic rhinitis due to pollen     Pt requesting to see an allergist for testing  Order placed  Relevant Orders    Ambulatory referral to Allergy       Other    Anxiety - Primary     Pt increased Zoloft to 100 mg in August and doing better  Will change to 100 mg tabs once daily  Refer to [employer]  Relevant Medications    sertraline (ZOLOFT) 100 mg tablet    Other Relevant Orders    Ambulatory referral to Social Work    Morbid obesity ([facility] [address] )     Pt  is up 9 pounds since May  She has a gym membership and will try to commit to going at least 2 times a week  Recheck in 6 months  Relevant Orders    Ambulatory referral to Social Work    Mixed hyperlipidemia     Trigs slightly elevated at 161, LDL OK at 117

## 2019-12-11 DIAGNOSIS — N91.4 SECONDARY OLIGOMENORRHEA: Primary | ICD-10-CM

## 2019-12-11 RX ORDER — TRANEXAMIC ACID 650 1/1
2 TABLET ORAL 3 TIMES DAILY
Qty: 30 TABLET | Refills: 3 | Status: SHIPPED | OUTPATIENT
Start: 2019-12-11 | End: 2019-12-16

## 2019-12-12 ENCOUNTER — TELEPHONE (OUTPATIENT)
Dept: OBGYN CLINIC | Facility: MEDICAL CENTER | Age: 29
End: 2019-12-12

## 2019-12-12 NOTE — TELEPHONE ENCOUNTER
[facility] called and wanted to double check if they should fill pt's rx for lysteda which contradicts with pts birth control, cryselle  Consulted with Dr Ashley Araujo and she said not to have it filled because medication shouldn't be taken together  She advised to follow up with you to see how you would like to proceed ?

## 2019-12-18 DIAGNOSIS — F41.9 ANXIETY: ICD-10-CM

## 2019-12-30 ENCOUNTER — OFFICE VISIT (OUTPATIENT)
Dept: URGENT CARE | Facility: CLINIC | Age: 29
End: 2019-12-30
Payer: COMMERCIAL

## 2019-12-30 VITALS
DIASTOLIC BLOOD PRESSURE: 72 MMHG | TEMPERATURE: 99.3 F | OXYGEN SATURATION: 97 % | RESPIRATION RATE: 20 BRPM | SYSTOLIC BLOOD PRESSURE: 136 MMHG | HEART RATE: 88 BPM

## 2019-12-30 DIAGNOSIS — J06.9 VIRAL UPPER RESPIRATORY TRACT INFECTION: Primary | ICD-10-CM

## 2019-12-30 DIAGNOSIS — R05.9 COUGH: ICD-10-CM

## 2019-12-30 PROCEDURE — G0382 LEV 3 HOSP TYPE B ED VISIT: HCPCS | Performed by: NURSE PRACTITIONER

## 2019-12-30 RX ORDER — DEXTROMETHORPHAN HYDROBROMIDE AND PROMETHAZINE HYDROCHLORIDE 15; 6.25 MG/5ML; MG/5ML
5 SOLUTION ORAL 4 TIMES DAILY PRN
Qty: 118 ML | Refills: 0 | Status: SHIPPED | OUTPATIENT
Start: 2019-12-30 | End: 2020-05-20 | Stop reason: ALTCHOICE

## 2019-12-30 RX ORDER — PREDNISONE 20 MG/1
20 TABLET ORAL 2 TIMES DAILY WITH MEALS
Qty: 10 TABLET | Refills: 0 | Status: SHIPPED | OUTPATIENT
Start: 2019-12-30 | End: 2020-01-04

## 2019-12-30 RX ORDER — BENZONATATE 100 MG/1
CAPSULE ORAL
COMMUNITY
Start: 2019-12-26 | End: 2020-05-20 | Stop reason: ALTCHOICE

## 2019-12-30 NOTE — PROGRESS NOTES
[address] [facility]        NAME: Jesse Collier is a 30 y o  female  : 1990    MRN: [id number]  DATE: 2019  TIME: 6:04 PM    Assessment and Plan   Viral upper respiratory tract infection [J06 9]  1  Viral upper respiratory tract infection  predniSONE 20 mg tablet    Promethazine-DM (PHENERGAN-DM) 6 25-15 mg/5 mL oral syrup   2  Cough  Promethazine-DM (PHENERGAN-DM) 6 25-15 mg/5 mL oral syrup   course of prednisone and promethazine dm for cough   Can not take any sudafed products   does take allegra daily   Tessalon not working   If no improvement with prednisone f/u with pcp   Pt in agreement with plan of care      Patient Instructions     Follow up with PCP in 3-5 days  Proceed to  ER if symptoms worsen  Chief Complaint     Chief Complaint   Patient presents with    Cough     Head and chest congestion with a cough  Trouble breathing at times  Ear congestion  Had rx for tessalon pearls and refilled it  For approx 1 5 weeks         History of Present Illness   Jesse Collier presents to the clinic c/o    Congestion, ear pain - bilateral ear pain - feeling run down  No body aches  No fevers   Fatigue is present  Cough is present but dry  Filled an old script for tessalon and it did not help  Cough keeping her up at night  No one else at home sick   Works at [employer] - around sick people daily  Review of Systems   Review of Systems   All other systems reviewed and are negative  Current Medications     Long-Term Medications   Medication Sig Dispense Refill    ALPRAZolam (XANAX) 0 25 mg tablet 1/2 to 2 tabs as needed for travel anxiety   10 tablet 0    fexofenadine (ALLEGRA ALLERGY) 180 MG tablet       norgestrel-ethinyl estradiol (CRYSELLE-28) 0 3 mg-30 mcg per tablet Take 1 tablet by mouth daily 28 tablet 11    sertraline (ZOLOFT) 100 mg tablet Take 1 tablet (100 mg total) by mouth daily 90 tablet 3    sertraline (ZOLOFT) 50 mg tablet TAKE 1 5 TABLETS BY MOUTH DAILY (Patient not taking: Reported on 12/30/2019) 135 tablet 1       Current Allergies     Allergies as of 12/30/2019 - Reviewed 12/30/2019   Allergen Reaction Noted    Pseudoephedrine Shortness Of Breath and Throat Swelling 12/13/2016    Pseudoephedrine-guaifenesin er Hyperactivity 12/13/2016    Rondec-d [chlophedianol-pseudoephedrine]  12/03/2019            The following portions of the patient's history were reviewed and updated as appropriate: allergies, current medications, past family history, past medical history, past social history, past surgical history and problem list     Objective   /72   Pulse 88   Temp 99 3 °F (37 4 °C) (Tympanic)   Resp 20   LMP 12/16/2019 (Approximate)   SpO2 97%        Physical Exam     Physical Exam   Constitutional: She is oriented to person, place, and time  Vital signs are normal  She appears well-developed and well-nourished  She is active and cooperative  obese   HENT:   Head: Normocephalic and atraumatic  Right Ear: Hearing, tympanic membrane, external ear and ear canal normal    Left Ear: Hearing, tympanic membrane, external ear and ear canal normal    Nose: Mucosal edema present  Right sinus exhibits no maxillary sinus tenderness and no frontal sinus tenderness  Left sinus exhibits no maxillary sinus tenderness and no frontal sinus tenderness  Mouth/Throat: Uvula is midline, oropharynx is clear and moist and mucous membranes are normal    Eyes: Conjunctivae and lids are normal    Cardiovascular: Normal rate, regular rhythm, S1 normal, S2 normal and normal heart sounds  Pulmonary/Chest: Effort normal and breath sounds normal    Neurological: She is alert and oriented to person, place, and time  Skin: Skin is warm and dry  Psychiatric: She has a normal mood and affect  Her speech is normal and behavior is normal  Judgment and thought content normal  Cognition and memory are normal    Nursing note and vitals reviewed

## 2019-12-30 NOTE — PATIENT INSTRUCTIONS

## 2020-03-20 DIAGNOSIS — F40.243 ANXIETY WITH FLYING: ICD-10-CM

## 2020-03-20 RX ORDER — ALPRAZOLAM 0.25 MG/1
TABLET ORAL
Qty: 30 TABLET | Refills: 0 | Status: SHIPPED | OUTPATIENT
Start: 2020-03-20 | End: 2020-10-08

## 2020-05-20 ENCOUNTER — TELEMEDICINE (OUTPATIENT)
Dept: FAMILY MEDICINE CLINIC | Facility: CLINIC | Age: 30
End: 2020-05-20
Payer: COMMERCIAL

## 2020-05-20 VITALS — HEIGHT: 63 IN | WEIGHT: 293 LBS | BODY MASS INDEX: 51.91 KG/M2 | TEMPERATURE: 97.3 F

## 2020-05-20 DIAGNOSIS — E66.01 MORBID OBESITY (HCC): ICD-10-CM

## 2020-05-20 DIAGNOSIS — H10.31 ACUTE BACTERIAL CONJUNCTIVITIS OF RIGHT EYE: Primary | ICD-10-CM

## 2020-05-20 PROCEDURE — 3008F BODY MASS INDEX DOCD: CPT | Performed by: FAMILY MEDICINE

## 2020-05-20 PROCEDURE — 99214 OFFICE O/P EST MOD 30 MIN: CPT | Performed by: FAMILY MEDICINE

## 2020-05-20 RX ORDER — CIPROFLOXACIN HYDROCHLORIDE 3.5 MG/ML
SOLUTION/ DROPS TOPICAL
Qty: 5 ML | Refills: 0 | Status: SHIPPED | OUTPATIENT
Start: 2020-05-20 | End: 2020-05-27

## 2020-08-02 DIAGNOSIS — N91.4 SECONDARY OLIGOMENORRHEA: ICD-10-CM

## 2020-08-02 RX ORDER — NORGESTREL-ETHINYL ESTRADIOL 0.3-0.03MG
TABLET ORAL
Qty: 84 TABLET | Refills: 3 | Status: SHIPPED | OUTPATIENT
Start: 2020-08-02 | End: 2021-08-02

## 2020-10-02 ENCOUNTER — OFFICE VISIT (OUTPATIENT)
Dept: FAMILY MEDICINE CLINIC | Facility: CLINIC | Age: 30
End: 2020-10-02
Payer: COMMERCIAL

## 2020-10-02 VITALS
BODY MASS INDEX: 51.91 KG/M2 | WEIGHT: 293 LBS | DIASTOLIC BLOOD PRESSURE: 76 MMHG | HEIGHT: 63 IN | SYSTOLIC BLOOD PRESSURE: 114 MMHG | HEART RATE: 84 BPM | TEMPERATURE: 97.5 F

## 2020-10-02 DIAGNOSIS — E66.01 MORBID OBESITY (HCC): ICD-10-CM

## 2020-10-02 DIAGNOSIS — E78.2 MIXED HYPERLIPIDEMIA: Primary | ICD-10-CM

## 2020-10-02 DIAGNOSIS — J30.1 NON-SEASONAL ALLERGIC RHINITIS DUE TO POLLEN: ICD-10-CM

## 2020-10-02 DIAGNOSIS — F41.9 ANXIETY: ICD-10-CM

## 2020-10-02 PROCEDURE — 99214 OFFICE O/P EST MOD 30 MIN: CPT | Performed by: PHYSICIAN ASSISTANT

## 2020-10-02 PROCEDURE — 1036F TOBACCO NON-USER: CPT | Performed by: PHYSICIAN ASSISTANT

## 2020-10-02 RX ORDER — DIPHENOXYLATE HYDROCHLORIDE AND ATROPINE SULFATE 2.5; .025 MG/1; MG/1
1 TABLET ORAL DAILY
COMMUNITY
End: 2022-01-06

## 2020-10-07 DIAGNOSIS — F40.243 ANXIETY WITH FLYING: ICD-10-CM

## 2020-10-08 RX ORDER — ALPRAZOLAM 0.25 MG/1
TABLET ORAL
Qty: 30 TABLET | Refills: 0 | Status: SHIPPED | OUTPATIENT
Start: 2020-10-08 | End: 2021-08-02

## 2020-12-05 LAB
ALBUMIN SERPL-MCNC: 3.7 G/DL (ref 3.6–5.1)
ALBUMIN/GLOB SERPL: 1.2 (CALC) (ref 1–2.5)
ALP SERPL-CCNC: 94 U/L (ref 31–125)
ALT SERPL-CCNC: 17 U/L (ref 6–29)
AST SERPL-CCNC: 13 U/L (ref 10–30)
BILIRUB SERPL-MCNC: 0.3 MG/DL (ref 0.2–1.2)
BUN SERPL-MCNC: 17 MG/DL (ref 7–25)
BUN/CREAT SERPL: NORMAL (CALC) (ref 6–22)
CALCIUM SERPL-MCNC: 9 MG/DL (ref 8.6–10.2)
CHLORIDE SERPL-SCNC: 104 MMOL/L (ref 98–110)
CHOLEST SERPL-MCNC: 177 MG/DL
CHOLEST/HDLC SERPL: 3.9 (CALC)
CO2 SERPL-SCNC: 28 MMOL/L (ref 20–32)
CREAT SERPL-MCNC: 0.76 MG/DL (ref 0.5–1.1)
GLOBULIN SER CALC-MCNC: 3 G/DL (CALC) (ref 1.9–3.7)
GLUCOSE SERPL-MCNC: 85 MG/DL (ref 65–99)
HDLC SERPL-MCNC: 45 MG/DL
LDLC SERPL CALC-MCNC: 104 MG/DL (CALC)
NONHDLC SERPL-MCNC: 132 MG/DL (CALC)
POTASSIUM SERPL-SCNC: 4.9 MMOL/L (ref 3.5–5.3)
PROT SERPL-MCNC: 6.7 G/DL (ref 6.1–8.1)
SL AMB EGFR AFRICAN AMERICAN: 123 ML/MIN/1.73M2
SL AMB EGFR NON AFRICAN AMERICAN: 106 ML/MIN/1.73M2
SODIUM SERPL-SCNC: 139 MMOL/L (ref 135–146)
TRIGL SERPL-MCNC: 163 MG/DL
TSH SERPL-ACNC: 2.71 MIU/L

## 2021-01-01 DIAGNOSIS — F41.9 ANXIETY: ICD-10-CM

## 2021-01-02 RX ORDER — SERTRALINE HYDROCHLORIDE 100 MG/1
TABLET, FILM COATED ORAL
Qty: 90 TABLET | Refills: 3 | Status: SHIPPED | OUTPATIENT
Start: 2021-01-02 | End: 2022-02-15 | Stop reason: SDUPTHER

## 2021-04-15 DIAGNOSIS — Z23 ENCOUNTER FOR IMMUNIZATION: ICD-10-CM

## 2021-06-29 ENCOUNTER — TELEPHONE (OUTPATIENT)
Dept: OTHER | Facility: OTHER | Age: 31
End: 2021-06-29

## 2021-08-02 DIAGNOSIS — F40.243 ANXIETY WITH FLYING: ICD-10-CM

## 2021-08-02 DIAGNOSIS — N91.4 SECONDARY OLIGOMENORRHEA: ICD-10-CM

## 2021-08-02 RX ORDER — NORGESTREL-ETHINYL ESTRADIOL 0.3-0.03MG
TABLET ORAL
Qty: 84 TABLET | Refills: 3 | Status: SHIPPED | OUTPATIENT
Start: 2021-08-02 | End: 2022-03-21 | Stop reason: SDUPTHER

## 2021-08-02 RX ORDER — ALPRAZOLAM 0.25 MG/1
TABLET ORAL
Qty: 30 TABLET | Refills: 0 | Status: SHIPPED | OUTPATIENT
Start: 2021-08-02 | End: 2022-02-15 | Stop reason: SDUPTHER

## 2021-08-13 ENCOUNTER — TELEPHONE (OUTPATIENT)
Dept: OTHER | Facility: OTHER | Age: 31
End: 2021-08-13

## 2021-08-13 NOTE — TELEPHONE ENCOUNTER
Patient called and is asking if the office can give her a call to schedule an appointment with the office

## 2021-08-19 ENCOUNTER — OFFICE VISIT (OUTPATIENT)
Dept: FAMILY MEDICINE CLINIC | Facility: CLINIC | Age: 31
End: 2021-08-19
Payer: COMMERCIAL

## 2021-08-19 VITALS
WEIGHT: 293 LBS | HEART RATE: 86 BPM | DIASTOLIC BLOOD PRESSURE: 72 MMHG | HEIGHT: 62 IN | RESPIRATION RATE: 18 BRPM | BODY MASS INDEX: 53.92 KG/M2 | SYSTOLIC BLOOD PRESSURE: 116 MMHG

## 2021-08-19 DIAGNOSIS — E66.01 MORBID OBESITY (HCC): ICD-10-CM

## 2021-08-19 DIAGNOSIS — F41.9 ANXIETY: ICD-10-CM

## 2021-08-19 DIAGNOSIS — Z12.39 ENCOUNTER FOR SCREENING FOR MALIGNANT NEOPLASM OF BREAST, UNSPECIFIED SCREENING MODALITY: ICD-10-CM

## 2021-08-19 DIAGNOSIS — Z11.59 NEED FOR HEPATITIS C SCREENING TEST: ICD-10-CM

## 2021-08-19 DIAGNOSIS — S39.012A STRAIN OF LUMBAR REGION, INITIAL ENCOUNTER: ICD-10-CM

## 2021-08-19 DIAGNOSIS — E78.2 MIXED HYPERLIPIDEMIA: Primary | ICD-10-CM

## 2021-08-19 PROCEDURE — 3008F BODY MASS INDEX DOCD: CPT | Performed by: NURSE PRACTITIONER

## 2021-08-19 PROCEDURE — 3725F SCREEN DEPRESSION PERFORMED: CPT | Performed by: PHYSICIAN ASSISTANT

## 2021-08-19 PROCEDURE — 99214 OFFICE O/P EST MOD 30 MIN: CPT | Performed by: PHYSICIAN ASSISTANT

## 2021-08-19 RX ORDER — BUDESONIDE AND FORMOTEROL FUMARATE DIHYDRATE 80; 4.5 UG/1; UG/1
AEROSOL RESPIRATORY (INHALATION)
COMMUNITY
Start: 2021-02-24

## 2021-08-19 RX ORDER — METHOCARBAMOL 750 MG/1
750 TABLET, FILM COATED ORAL 3 TIMES DAILY PRN
Qty: 30 TABLET | Refills: 0 | Status: SHIPPED | OUTPATIENT
Start: 2021-08-19 | End: 2022-02-15 | Stop reason: ALTCHOICE

## 2021-08-19 NOTE — PATIENT INSTRUCTIONS
Problem List Items Addressed This Visit        Musculoskeletal and Integument    Lumbar strain     Trial Robaxin and stretches given  Other    Anxiety    Relevant Medications    sertraline (ZOLOFT) 50 mg tablet    Morbid obesity (HCC)    Mixed hyperlipidemia - Primary      Other Visit Diagnoses     Encounter for screening for malignant neoplasm of breast, unspecified screening modality        Relevant Orders    Ambulatory referral to Gynecology        Acute Low Back Pain, Ambulatory Care   GENERAL INFORMATION:   Acute low back pain  is discomfort in your lower back area that lasts for less than 12 weeks  The word acute is used to describe pain that starts suddenly, worsens quickly, and lasts for a short time  Common symptoms include the following:   · Back stiffness or spasms    · Pain down the back or side of one leg    · Holding yourself in an unusual position or posture to decrease your back pain    · Not being able to find a sitting position that is comfortable    · Slow increase in your pain for 24 to 48 hours after you stress your back    · Tenderness on your lower back or severe pain when you move your back  Seek immediate care for the following symptoms:   · Severe pain    · Sudden stiffness and heaviness in both buttocks down to both legs    · Numbness or weakness in one leg, or pain in both legs    · Numbness in your genital area or across your lower back    · Unable to control your urine or bowel movements  Treatment for acute low back pain  may include any of the following:  · Medicines:      ¨ NSAIDs  help decrease swelling and pain or fever  This medicine is available with or without a doctor's order  NSAIDs can cause stomach bleeding or kidney problems in certain people  If you take blood thinner medicine, always ask your healthcare provider if NSAIDs are safe for you  Always read the medicine label and follow directions      ¨ Muscle relaxers  help decrease muscle spasms pain     ¨ Prescription pain medicine  may be given  Ask how to take this medicine safely  · Surgery  may be needed if your pain is severe and other treatments do not work  Surgery may be needed for conditions of the lumbar spine, such as herniated disc or spinal stenosis  Manage your symptoms:   · Sleep on a firm mattress  If you do not have a firm mattress, have someone move your mattress to the floor for a few days  A piece of plywood under your mattress can also help make it firmer  · Apply ice  on your lower back for 15 to 20 minutes every hour or as directed  Use an ice pack, or put crushed ice in a plastic bag  Cover it with a towel  Ice helps prevent tissue damage and decreases swelling and pain  You can alternate ice and heat  · Apply heat  on your lower back for 20 to 30 minutes every 2 hours for as many days as directed  Heat helps decrease pain and muscle spasms  · Go to physical therapy  A physical therapist teaches you exercises to help improve movement and strength, and to decrease pain  Prevent acute low back pain:   · Use proper body mechanics  ¨ Bend at the hips and knees when you  objects  Do not bend from the waist  Use your leg muscles as you lift the load  Do not use your back  Keep the object close to your chest as you lift it  Try not to twist or lift anything above your waist     ¨ Change your position often when you stand for long periods of time  Rest one foot on a small box or footrest, and then switch to the other foot often  ¨ Try not to sit for long periods of time  When you do, sit in a straight-backed chair with your feet flat on the floor  Never reach, pull, or push while you are sitting  · Exercise regularly  Warm up before you exercise  Do exercises that strengthen your back muscles  Ask about the best exercise plan for you  · Maintain a healthy weight  Ask your healthcare provider how much you should weigh   Ask him to help you create a weight loss plan if you are overweight  Follow up with your healthcare provider as directed:  Return for a follow-up visit if you still have pain after 1 to 3 weeks of treatment  You may need to visit an orthopedist if your back pain lasts more than 6 to 12 weeks  Write down your questions so you remember to ask them during your visits  CARE AGREEMENT:   You have the right to help plan your care  Learn about your health condition and how it may be treated  Discuss treatment options with your caregivers to decide what care you want to receive  You always have the right to refuse treatment  The above information is an  only  It is not intended as medical advice for individual conditions or treatments  Talk to your doctor, nurse or pharmacist before following any medical regimen to see if it is safe and effective for you  © 2014 [address] is for End User's use only and may not be sold, redistributed or otherwise used for commercial purposes  All illustrations and images included in CareNotes® are the copyrighted property of A D A M , Inc  or Benedicto Mcleod

## 2021-08-19 NOTE — PROGRESS NOTES
Assessment and Plan:    Problem List Items Addressed This Visit        Musculoskeletal and Integument    Lumbar strain     Trial Robaxin and stretches given  Relevant Medications    methocarbamol (ROBAXIN) 750 mg tablet       Other    Anxiety     Uncontrolled increase Zoloft to 150 mg once daily recheck 4 months  Relevant Medications    sertraline (ZOLOFT) 50 mg tablet    Morbid obesity (HCC)    Mixed hyperlipidemia - Primary     Check lipid panel in 4 months  Relevant Orders    Lipid Panel with Direct LDL reflex    Comprehensive metabolic panel      Other Visit Diagnoses     Encounter for screening for malignant neoplasm of breast, unspecified screening modality        Relevant Orders    Ambulatory referral to Gynecology    Need for hepatitis C screening test        Relevant Orders    Hepatitis C Antibody ([facility])                 Diagnoses and all orders for this visit:    Mixed hyperlipidemia  -     Lipid Panel with Direct LDL reflex; Future  -     Comprehensive metabolic panel; Future    Morbid obesity (HCC)    Anxiety  -     sertraline (ZOLOFT) 50 mg tablet; Take 1 tablet (50 mg total) by mouth daily TAKE DAILY WITH 100 MG ZOLOFT TO EQUAL 150 MG DAILY  Encounter for screening for malignant neoplasm of breast, unspecified screening modality  -     Ambulatory referral to Gynecology; Future    Strain of lumbar region, initial encounter  -     methocarbamol (ROBAXIN) 750 mg tablet; Take 1 tablet (750 mg total) by mouth 3 (three) times a day as needed for muscle spasms for up to 14 days    Need for hepatitis C screening test  -     Hepatitis C Antibody ([facility]); Future    Other orders  -     budesonide-formoterol (Symbicort) 80-4 5 MCG/ACT inhaler  -     OLOPATADINE HCL NA              Subjective:      Patient ID: Candelario Devries is a 28 y o  female      CC:    Chief Complaint   Patient presents with    Medication Problem     patient thinks she needs her Zoloft dose increased    Back spasms       HPI:     Patient works as a pharmacy tech at [employer] and she has had an increase in stress with her job  She has anxiety and the mask makes her anxiety worse  She is requesting a possible increase her Zoloft  No SI or HI  She states that 3 for emesis in the last 2 weeks have quit because of the new system that [facility] has implemented  Also patient states that she somehow twisted her injured her back she has been having some back spasms on the left lower side lately  She does stand all day for her job  The following portions of the patient's history were reviewed and updated as appropriate: allergies, current medications, past family history, past medical history, past social history, past surgical history and problem list       Review of Systems   Constitutional: Negative  HENT: Negative  Eyes: Negative  Respiratory: Negative  Cardiovascular: Negative  Gastrointestinal: Negative  Endocrine: Negative  Genitourinary: Negative  Musculoskeletal: Positive for back pain  Skin: Negative  Allergic/Immunologic: Negative  Neurological: Negative  Hematological: Negative  Psychiatric/Behavioral: The patient is nervous/anxious  Data to review:       Objective:    Vitals:    08/19/21 0926   BP: 116/72   BP Location: Left arm   Patient Position: Sitting   Cuff Size: Adult   Pulse: 86   Resp: 18   Weight: (!) 156 kg (344 lb)   Height: 5' 2" (1 575 m)        Physical Exam  Vitals and nursing note reviewed  Constitutional:       Appearance: Normal appearance  She is well-developed  HENT:      Head: Normocephalic and atraumatic  Eyes:      General: Lids are normal       Conjunctiva/sclera: Conjunctivae normal       Pupils: Pupils are equal, round, and reactive to light  Cardiovascular:      Rate and Rhythm: Normal rate and regular rhythm  Heart sounds: No murmur heard       Pulmonary:      Effort: Pulmonary effort is normal       Breath sounds: Normal breath sounds  Musculoskeletal:      Comments:  Difficult to do an appropriate back examination as patient is morbidly obese  Skin:     General: Skin is warm and dry  Neurological:      General: No focal deficit present  Mental Status: She is alert  Coordination: Coordination is intact  Psychiatric:         Mood and Affect: Mood normal          Behavior: Behavior normal  Behavior is cooperative  Thought Content: Thought content normal          Judgment: Judgment normal            BMI Counseling: Body mass index is 62 92 kg/m²  The BMI is above normal  Nutrition recommendations include decreasing portion sizes, encouraging healthy choices of fruits and vegetables, decreasing fast food intake, consuming healthier snacks, limiting drinks that contain sugar, moderation in carbohydrate intake, increasing intake of lean protein, reducing intake of saturated and trans fat and reducing intake of cholesterol  Exercise recommendations include exercising 3-5 times per week  No pharmacotherapy was ordered  BMI Counseling: Body mass index is 62 92 kg/m²  The BMI is above normal  Nutrition recommendations include reducing portion sizes, decreasing overall calorie intake, 3-5 servings of fruits/vegetables daily, reducing fast food intake, consuming healthier snacks, decreasing soda and/or juice intake, moderation in carbohydrate intake, increasing intake of lean protein, reducing intake of saturated fat and trans fat and reducing intake of cholesterol

## 2021-08-24 ENCOUNTER — TELEPHONE (OUTPATIENT)
Dept: FAMILY MEDICINE CLINIC | Facility: CLINIC | Age: 31
End: 2021-08-24

## 2021-08-24 ENCOUNTER — TELEMEDICINE (OUTPATIENT)
Dept: FAMILY MEDICINE CLINIC | Facility: CLINIC | Age: 31
End: 2021-08-24
Payer: COMMERCIAL

## 2021-08-24 VITALS — BODY MASS INDEX: 62.92 KG/M2 | WEIGHT: 293 LBS

## 2021-08-24 DIAGNOSIS — J30.1 NON-SEASONAL ALLERGIC RHINITIS DUE TO POLLEN: ICD-10-CM

## 2021-08-24 DIAGNOSIS — J01.40 ACUTE NON-RECURRENT PANSINUSITIS: Primary | ICD-10-CM

## 2021-08-24 DIAGNOSIS — J40 BRONCHITIS: ICD-10-CM

## 2021-08-24 PROCEDURE — 99213 OFFICE O/P EST LOW 20 MIN: CPT | Performed by: NURSE PRACTITIONER

## 2021-08-24 PROCEDURE — 1036F TOBACCO NON-USER: CPT | Performed by: NURSE PRACTITIONER

## 2021-08-24 RX ORDER — FLUTICASONE PROPIONATE 50 MCG
1 SPRAY, SUSPENSION (ML) NASAL DAILY
Qty: 11.1 ML | Refills: 5 | Status: SHIPPED | OUTPATIENT
Start: 2021-08-24 | End: 2022-01-06

## 2021-08-24 RX ORDER — BENZONATATE 100 MG/1
100 CAPSULE ORAL 3 TIMES DAILY PRN
Qty: 20 CAPSULE | Refills: 0 | Status: SHIPPED | OUTPATIENT
Start: 2021-08-24 | End: 2022-01-06

## 2021-08-24 RX ORDER — AZITHROMYCIN 250 MG/1
TABLET, FILM COATED ORAL
Qty: 6 TABLET | Refills: 0 | Status: SHIPPED | OUTPATIENT
Start: 2021-08-24 | End: 2021-08-29

## 2021-08-24 RX ORDER — DEXTROMETHORPHAN HYDROBROMIDE AND PROMETHAZINE HYDROCHLORIDE 15; 6.25 MG/5ML; MG/5ML
2.5 SOLUTION ORAL 4 TIMES DAILY PRN
Qty: 118 ML | Refills: 0 | Status: SHIPPED | OUTPATIENT
Start: 2021-08-24 | End: 2022-01-06

## 2021-08-24 RX ORDER — PREDNISONE 10 MG/1
TABLET ORAL
Qty: 30 TABLET | Refills: 0 | Status: SHIPPED | OUTPATIENT
Start: 2021-08-24 | End: 2022-01-06

## 2021-08-24 RX ORDER — GUAIFENESIN 600 MG
1200 TABLET, EXTENDED RELEASE 12 HR ORAL EVERY 12 HOURS SCHEDULED
Qty: 60 TABLET | Refills: 0 | Status: SHIPPED | OUTPATIENT
Start: 2021-08-24 | End: 2022-01-06

## 2021-08-24 RX ORDER — LORATADINE 10 MG/1
10 TABLET ORAL DAILY
Qty: 30 TABLET | Refills: 5 | Status: SHIPPED | OUTPATIENT
Start: 2021-08-24 | End: 2022-01-06

## 2021-08-24 NOTE — PROGRESS NOTES
Virtual Regular Visit    Verification of patient location:    Patient is located in the following state in which I hold an active license [place]      Assessment/Plan:    Problem List Items Addressed This Visit        Respiratory    Non-seasonal allergic rhinitis due to pollen       Claritin and Flonase ordered to be taken daily to prevent recurrent sinusitis  Relevant Medications    loratadine (CLARITIN) 10 mg tablet    fluticasone (FLONASE) 50 mcg/act nasal spray    azithromycin (Zithromax) 250 mg tablet    predniSONE 10 mg tablet    Acute non-recurrent pansinusitis - Primary       Azithromycin and prednisone taper ordered to treat sinusitis  Relevant Medications    loratadine (CLARITIN) 10 mg tablet    fluticasone (FLONASE) 50 mcg/act nasal spray    azithromycin (Zithromax) 250 mg tablet    benzonatate (TESSALON PERLES) 100 mg capsule    predniSONE 10 mg tablet    Promethazine-DM (PHENERGAN-DM) 6 25-15 mg/5 mL oral syrup    Bronchitis       Patient advised to begin using Symbicort daily and ProAir as needed until shortness of breath resolves  Tessalon Perles ordered to be used t i d  p r n  during the day and Phenergan DM ordered to be used HS to help with cough  Mucinex also ordered to be used every 12 hours to help with chest congestion           Relevant Medications    loratadine (CLARITIN) 10 mg tablet    guaiFENesin (MUCINEX) 600 mg 12 hr tablet    fluticasone (FLONASE) 50 mcg/act nasal spray    benzonatate (TESSALON PERLES) 100 mg capsule    predniSONE 10 mg tablet    Promethazine-DM (PHENERGAN-DM) 6 25-15 mg/5 mL oral syrup               Reason for visit is   Chief Complaint   Patient presents with    Nasal Congestion     x 5 days for all sx's    mgb    Earache     both ears    Sore Throat    Virtual Regular Visit        Encounter provider ARACELI Griffith    Provider located at [address] [facility]  [address] [address] [place] [id number]  [phone]      Recent Visits  Date Type Provider Dept   08/19/21 Office Visit Raheel Betancourt PA-C [facility]   Showing recent visits within past 7 days and meeting all other requirements  Today's Visits  Date Type Provider Dept   08/24/21 Telemedicine Marcia Hirsch [facility]   Showing today's visits and meeting all other requirements  Future Appointments  No visits were found meeting these conditions  Showing future appointments within next 150 days and meeting all other requirements       The patient was identified by name and date of birth  Davin Soto was informed that this is a telemedicine visit and that the visit is being conducted through [address] [place] [address] Now and patient was informed that this is a secure, HIPAA-compliant platform  She agrees to proceed     My office door was closed  No one else was in the room  She acknowledged consent and understanding of privacy and security of the video platform  The patient has agreed to participate and understands they can discontinue the visit at any time  Patient is aware this is a billable service  Subjective  Davin Soto is a 28 y o  female    The patient is reporting symptoms of PND, bilateral ear pain, sneezing, sinus congestion and pressure, and productive cough  The patient is reporting some SOB as well  The patient reports she has never officially been diagnosed with asthma but she was ordered a Symbicort inhaler to use in the past   She also has a ProAir inhaler on hand to use as needed  She has not needed to use either of these inhalers since her symptoms began  The patient has been having symptoms for the past 5 days  Of note the patient is fully vaccinated against COVID and has had no known exposures to a COVID positive individual        No past medical history on file  No past surgical history on file      Current Outpatient Medications   Medication Sig Dispense Refill    ALPRAZolam Hannah Herrera) 0 25 mg tablet 1/2 TO 2 TABS AS NEEDED FOR TRAVEL ANXIETY  30 tablet 0    budesonide-formoterol (Symbicort) 80-4 5 MCG/ACT inhaler       Cryselle-28 0 3-30 MG-MCG per tablet TAKE 1 TABLET BY MOUTH EVERY DAY 84 tablet 3    methocarbamol (ROBAXIN) 750 mg tablet Take 1 tablet (750 mg total) by mouth 3 (three) times a day as needed for muscle spasms for up to 14 days 30 tablet 0    multivitamin (THERAGRAN) TABS Take 1 tablet by mouth daily      OLOPATADINE HCL NA       PROAIR  (90 Base) MCG/ACT inhaler       sertraline (ZOLOFT) 100 mg tablet TAKE 1 TABLET BY MOUTH EVERY DAY 90 tablet 3    sertraline (ZOLOFT) 50 mg tablet Take 1 tablet (50 mg total) by mouth daily TAKE DAILY WITH 100 MG ZOLOFT TO EQUAL 150 MG DAILY  90 tablet 3    azithromycin (Zithromax) 250 mg tablet Take 2 tablets (500 mg total) by mouth daily for 1 day, THEN 1 tablet (250 mg total) daily for 4 days  6 tablet 0    benzonatate (TESSALON PERLES) 100 mg capsule Take 1 capsule (100 mg total) by mouth 3 (three) times a day as needed for cough 20 capsule 0    fluticasone (FLONASE) 50 mcg/act nasal spray 1 spray into each nostril daily 11 1 mL 5    guaiFENesin (MUCINEX) 600 mg 12 hr tablet Take 2 tablets (1,200 mg total) by mouth every 12 (twelve) hours 60 tablet 0    loratadine (CLARITIN) 10 mg tablet Take 1 tablet (10 mg total) by mouth daily 30 tablet 5    predniSONE 10 mg tablet Use 5 tablets for 2 days  Use 4 tablets for 2 days  Use 3 tablets for 2 days  Use 2 tablets for 2 days  Use 1 tablet for 2 days  30 tablet 0    Promethazine-DM (PHENERGAN-DM) 6 25-15 mg/5 mL oral syrup Take 2 5 mL by mouth 4 (four) times a day as needed for cough 118 mL 0     No current facility-administered medications for this visit          Allergies   Allergen Reactions    Pseudoephedrine Shortness Of Breath and Throat Swelling    Pseudoephedrine-Guaifenesin Er Hyperactivity    Rondec-D [Chlophedianol-Pseudoephedrine]      Cough syrup;hyperactive    Essence Garcia (Corn Pollen) Allergy Skin Test Hives, Itching, Rash and Swelling       Review of Systems   Constitutional: Negative for chills and fever  HENT: Positive for congestion, ear pain (bilateral ), postnasal drip, rhinorrhea, sinus pressure, sinus pain and sneezing  Negative for sore throat  Eyes: Negative for pain and visual disturbance  Respiratory: Positive for cough (productive ), chest tightness and shortness of breath (intermittent)  Cardiovascular: Negative for chest pain, palpitations and leg swelling  Gastrointestinal: Negative for abdominal pain, constipation, diarrhea, nausea and vomiting  Endocrine: Negative for cold intolerance and heat intolerance  Genitourinary: Negative for decreased urine volume, dysuria and hematuria  Musculoskeletal: Negative for arthralgias, back pain and myalgias  Skin: Negative for color change and rash  Allergic/Immunologic: Positive for environmental allergies  Neurological: Negative for dizziness, seizures, syncope, weakness, light-headedness, numbness and headaches  Hematological: Negative for adenopathy  Psychiatric/Behavioral: Negative for confusion  The patient is not nervous/anxious  All other systems reviewed and are negative  Video Exam    Vitals:    08/24/21 0845   Weight: (!) 156 kg (344 lb)       Physical Exam  Vitals reviewed: limited due to [employer] exam    Constitutional:       General: She is not in acute distress  Appearance: She is well-developed  She is not ill-appearing  Neurological:      Mental Status: She is alert  I spent 15 minutes directly with the patient during this visit    VIRTUAL VISIT Edgar verbally agrees to participate in [employer]   Pt is aware that [employer] could be limited without vital signs or the ability to perform a full hands-on physical exam  Neelima Mohr understands she or the provider may request at any time to terminate the video visit and request the patient to seek care or treatment in person

## 2021-08-24 NOTE — PATIENT INSTRUCTIONS
Problem List Items Addressed This Visit        Respiratory    Non-seasonal allergic rhinitis due to pollen       Claritin and Flonase ordered to be taken daily to prevent recurrent sinusitis  Relevant Medications    loratadine (CLARITIN) 10 mg tablet    fluticasone (FLONASE) 50 mcg/act nasal spray    azithromycin (Zithromax) 250 mg tablet    predniSONE 10 mg tablet    Acute non-recurrent pansinusitis - Primary       Azithromycin and prednisone taper ordered to treat sinusitis  Relevant Medications    loratadine (CLARITIN) 10 mg tablet    fluticasone (FLONASE) 50 mcg/act nasal spray    azithromycin (Zithromax) 250 mg tablet    benzonatate (TESSALON PERLES) 100 mg capsule    predniSONE 10 mg tablet    Promethazine-DM (PHENERGAN-DM) 6 25-15 mg/5 mL oral syrup    Bronchitis       Patient advised to begin using Symbicort daily and ProAir as needed until shortness of breath resolves  Tessalon Perles ordered to be used t i d  p r n  during the day and Phenergan DM ordered to be used HS to help with cough  Mucinex also ordered to be used every 12 hours to help with chest congestion           Relevant Medications    loratadine (CLARITIN) 10 mg tablet    guaiFENesin (MUCINEX) 600 mg 12 hr tablet    fluticasone (FLONASE) 50 mcg/act nasal spray    benzonatate (TESSALON PERLES) 100 mg capsule    predniSONE 10 mg tablet    Promethazine-DM (PHENERGAN-DM) 6 25-15 mg/5 mL oral syrup

## 2021-08-24 NOTE — TELEPHONE ENCOUNTER
Patient would like a note for work  She had a virtual appointment today and went to work but would like to stay home tomorrow and go back on Thursday   This way she will have one full day of antibiotics and rest  Please call her at [phone]

## 2021-08-24 NOTE — ASSESSMENT & PLAN NOTE
Patient advised to begin using Symbicort daily and ProAir as needed until shortness of breath resolves  Tessalon Perles ordered to be used t i d  p r n  during the day and Phenergan DM ordered to be used HS to help with cough  Mucinex also ordered to be used every 12 hours to help with chest congestion

## 2021-10-21 ENCOUNTER — TELEPHONE (OUTPATIENT)
Dept: OTHER | Facility: OTHER | Age: 31
End: 2021-10-21

## 2021-12-21 LAB
ALBUMIN SERPL-MCNC: 3.8 G/DL (ref 3.6–5.1)
ALBUMIN/GLOB SERPL: 1.2 (CALC) (ref 1–2.5)
ALP SERPL-CCNC: 92 U/L (ref 31–125)
ALT SERPL-CCNC: 13 U/L (ref 6–29)
AST SERPL-CCNC: 13 U/L (ref 10–30)
BILIRUB SERPL-MCNC: 0.3 MG/DL (ref 0.2–1.2)
BUN SERPL-MCNC: 12 MG/DL (ref 7–25)
BUN/CREAT SERPL: NORMAL (CALC) (ref 6–22)
CALCIUM SERPL-MCNC: 8.9 MG/DL (ref 8.6–10.2)
CHLORIDE SERPL-SCNC: 103 MMOL/L (ref 98–110)
CHOLEST SERPL-MCNC: 176 MG/DL
CHOLEST/HDLC SERPL: 3.7 (CALC)
CO2 SERPL-SCNC: 29 MMOL/L (ref 20–32)
CREAT SERPL-MCNC: 0.67 MG/DL (ref 0.5–1.1)
GLOBULIN SER CALC-MCNC: 3.2 G/DL (CALC) (ref 1.9–3.7)
GLUCOSE SERPL-MCNC: 95 MG/DL (ref 65–99)
HCV AB S/CO SERPL IA: 0.04
HCV AB SERPL QL IA: NORMAL
HDLC SERPL-MCNC: 48 MG/DL
LDLC SERPL CALC-MCNC: 102 MG/DL (CALC)
NONHDLC SERPL-MCNC: 128 MG/DL (CALC)
POTASSIUM SERPL-SCNC: 4.3 MMOL/L (ref 3.5–5.3)
PROT SERPL-MCNC: 7 G/DL (ref 6.1–8.1)
SL AMB EGFR AFRICAN AMERICAN: 137 ML/MIN/1.73M2
SL AMB EGFR NON AFRICAN AMERICAN: 118 ML/MIN/1.73M2
SODIUM SERPL-SCNC: 139 MMOL/L (ref 135–146)
TRIGL SERPL-MCNC: 161 MG/DL

## 2022-01-06 PROBLEM — J01.40 ACUTE NON-RECURRENT PANSINUSITIS: Status: RESOLVED | Noted: 2021-08-24 | Resolved: 2022-01-06

## 2022-01-06 PROBLEM — J40 BRONCHITIS: Status: RESOLVED | Noted: 2021-08-24 | Resolved: 2022-01-06

## 2022-01-07 ENCOUNTER — TELEPHONE (OUTPATIENT)
Dept: OTHER | Facility: OTHER | Age: 32
End: 2022-01-07

## 2022-01-07 NOTE — TELEPHONE ENCOUNTER
Pt called to cancel her appointment and will call back to reschedule      Name: Amber Humphrey MRN: [id number]   Date: 1/7/2022 Status: [facility]   Time: 8:00 AM Length: 15   Visit Type: OFFICE VISIT SHORT PG [[id number]] Copay: $0 00   Provider: Kenyatta Horton PA-C Department: [facility] PR[facility]

## 2022-02-15 ENCOUNTER — OFFICE VISIT (OUTPATIENT)
Dept: FAMILY MEDICINE CLINIC | Facility: CLINIC | Age: 32
End: 2022-02-15
Payer: COMMERCIAL

## 2022-02-15 VITALS
BODY MASS INDEX: 53.92 KG/M2 | OXYGEN SATURATION: 97 % | HEIGHT: 62 IN | WEIGHT: 293 LBS | HEART RATE: 107 BPM | SYSTOLIC BLOOD PRESSURE: 128 MMHG | DIASTOLIC BLOOD PRESSURE: 72 MMHG

## 2022-02-15 DIAGNOSIS — F40.243 ANXIETY WITH FLYING: ICD-10-CM

## 2022-02-15 DIAGNOSIS — E78.2 MIXED HYPERLIPIDEMIA: ICD-10-CM

## 2022-02-15 DIAGNOSIS — F41.9 ANXIETY: Primary | ICD-10-CM

## 2022-02-15 DIAGNOSIS — E66.01 MORBID OBESITY (HCC): ICD-10-CM

## 2022-02-15 PROBLEM — S39.012A LUMBAR STRAIN: Status: RESOLVED | Noted: 2021-08-19 | Resolved: 2022-02-15

## 2022-02-15 PROCEDURE — 1036F TOBACCO NON-USER: CPT | Performed by: PHYSICIAN ASSISTANT

## 2022-02-15 PROCEDURE — 3008F BODY MASS INDEX DOCD: CPT | Performed by: PHYSICIAN ASSISTANT

## 2022-02-15 PROCEDURE — 99214 OFFICE O/P EST MOD 30 MIN: CPT | Performed by: PHYSICIAN ASSISTANT

## 2022-02-15 RX ORDER — ALPRAZOLAM 0.25 MG/1
TABLET ORAL
Qty: 30 TABLET | Refills: 0 | Status: SHIPPED | OUTPATIENT
Start: 2022-02-15 | End: 2022-04-27

## 2022-02-15 RX ORDER — SERTRALINE HYDROCHLORIDE 100 MG/1
100 TABLET, FILM COATED ORAL DAILY
Qty: 90 TABLET | Refills: 3 | Status: SHIPPED | OUTPATIENT
Start: 2022-02-15

## 2022-02-15 NOTE — PROGRESS NOTES
Assessment and Plan:    Problem List Items Addressed This Visit        Other    Anxiety - Primary     Stable on zoloft 150 daily  NO HI or SI  Relevant Medications    sertraline (ZOLOFT) 50 mg tablet    sertraline (ZOLOFT) 100 mg tablet    Morbid obesity (HCC)     PT has lost 10 pounds since the last visit  She is encouraged to continue her efforts  Mixed hyperlipidemia     Stable with LDL of only 102 and slight elevation of trigs at 161  Check yearly  Relevant Orders    Comprehensive metabolic panel    Lipid Panel with Direct LDL reflex      Other Visit Diagnoses     Anxiety with flying        Relevant Medications    ALPRAZolam (XANAX) 0 25 mg tablet                 Diagnoses and all orders for this visit:    Anxiety  -     sertraline (ZOLOFT) 50 mg tablet; Take 1 tablet (50 mg total) by mouth daily TAKE DAILY WITH 100 MG ZOLOFT TO EQUAL 150 MG DAILY  -     sertraline (ZOLOFT) 100 mg tablet; Take 1 tablet (100 mg total) by mouth daily    Mixed hyperlipidemia  -     Comprehensive metabolic panel; Future  -     Lipid Panel with Direct LDL reflex; Future    Morbid obesity ([facility] [address] )    Anxiety with flying  -     ALPRAZolam (XANAX) 0 25 mg tablet; 1/2 TO 2 TABS AS NEEDED FOR TRAVEL ANXIETY  Subjective:      Patient ID: Christopher Perez is a 28 y o  female  CC:    Chief Complaint   Patient presents with    Follow-up     Patient present today for her routine follow up  HPI:      Christopher Perez is here for chronic conditions f/u including the diagnosis of Anxiety  (primary encounter diagnosis)  Mixed hyperlipidemia  Morbid obesity (hcc)  Anxiety with flying   Pt  states they are taking all medications as directed without complaints or side effects  Pt  had labs done prior to today's visit which included  Negative C,  Trigs 161 and HDl 48        The following portions of the patient's history were reviewed and updated as appropriate: allergies, current medications, past family history, past medical history, past social history, past surgical history and problem list       Review of Systems   Constitutional: Negative  HENT: Negative  Eyes: Negative  Respiratory: Negative  Cardiovascular: Negative  Gastrointestinal: Negative  Endocrine: Negative  Genitourinary: Negative  Musculoskeletal: Negative  Skin: Negative  Allergic/Immunologic: Negative  Neurological: Negative  Hematological: Negative  Psychiatric/Behavioral: Negative  Data to review:       Objective:    Vitals:    02/15/22 1805 02/15/22 1830   BP: 158/88 128/72   BP Location: Left arm    Patient Position: Sitting    Cuff Size: Large    Pulse: (!) 107    SpO2: 97%    Weight: (!) 152 kg (334 lb)    Height: 5' 2" (1 575 m)         Physical Exam  Vitals and nursing note reviewed  Constitutional:       Appearance: Normal appearance  She is well-developed  HENT:      Head: Normocephalic and atraumatic  Eyes:      General: Lids are normal       Conjunctiva/sclera: Conjunctivae normal       Pupils: Pupils are equal, round, and reactive to light  Cardiovascular:      Rate and Rhythm: Normal rate and regular rhythm  Heart sounds: No murmur heard  Pulmonary:      Effort: Pulmonary effort is normal       Breath sounds: Normal breath sounds  Skin:     General: Skin is warm and dry  Neurological:      General: No focal deficit present  Mental Status: She is alert  Coordination: Coordination is intact  Psychiatric:         Mood and Affect: Mood normal          Behavior: Behavior normal  Behavior is cooperative  Thought Content: Thought content normal          Judgment: Judgment normal            BMI Counseling: Body mass index is 61 09 kg/m²   The BMI is above normal  Nutrition recommendations include decreasing portion sizes, encouraging healthy choices of fruits and vegetables, decreasing fast food intake, consuming healthier snacks, limiting drinks that contain sugar, moderation in carbohydrate intake, increasing intake of lean protein, reducing intake of saturated and trans fat and reducing intake of cholesterol  Exercise recommendations include exercising 3-5 times per week  No pharmacotherapy was ordered  Rationale for BMI follow-up plan is due to patient being overweight or obese

## 2022-02-15 NOTE — PATIENT INSTRUCTIONS
Problem List Items Addressed This Visit        Other    Anxiety - Primary     Stable on zoloft 150 daily  NO HI or SI  Relevant Medications    sertraline (ZOLOFT) 50 mg tablet    sertraline (ZOLOFT) 100 mg tablet    Morbid obesity (HCC)     PT has lost 10 pounds since the last visit  She is encouraged to continue her efforts  Mixed hyperlipidemia     Stable with LDL of only 102 and slight elevation of trigs at 161  Check yearly            Relevant Orders    Comprehensive metabolic panel    Lipid Panel with Direct LDL reflex      Other Visit Diagnoses     Anxiety with flying        Relevant Medications    ALPRAZolam (XANAX) 0 25 mg tablet

## 2022-03-01 ENCOUNTER — TELEMEDICINE (OUTPATIENT)
Dept: FAMILY MEDICINE CLINIC | Facility: CLINIC | Age: 32
End: 2022-03-01
Payer: COMMERCIAL

## 2022-03-01 VITALS — WEIGHT: 293 LBS | TEMPERATURE: 98.7 F | BODY MASS INDEX: 61.09 KG/M2

## 2022-03-01 DIAGNOSIS — J01.00 ACUTE NON-RECURRENT MAXILLARY SINUSITIS: Primary | ICD-10-CM

## 2022-03-01 DIAGNOSIS — J40 BRONCHITIS: ICD-10-CM

## 2022-03-01 PROCEDURE — 99214 OFFICE O/P EST MOD 30 MIN: CPT | Performed by: NURSE PRACTITIONER

## 2022-03-01 PROCEDURE — 3725F SCREEN DEPRESSION PERFORMED: CPT | Performed by: NURSE PRACTITIONER

## 2022-03-01 RX ORDER — PREDNISONE 10 MG/1
TABLET ORAL
Qty: 30 TABLET | Refills: 0 | Status: SHIPPED | OUTPATIENT
Start: 2022-03-01 | End: 2022-03-21

## 2022-03-01 RX ORDER — FLUTICASONE PROPIONATE 50 MCG
1 SPRAY, SUSPENSION (ML) NASAL DAILY
Qty: 11.1 ML | Refills: 3 | Status: SHIPPED | OUTPATIENT
Start: 2022-03-01

## 2022-03-01 RX ORDER — AZITHROMYCIN 250 MG/1
TABLET, FILM COATED ORAL
Qty: 6 TABLET | Refills: 0 | Status: SHIPPED | OUTPATIENT
Start: 2022-03-01 | End: 2022-03-06

## 2022-03-01 NOTE — PATIENT INSTRUCTIONS
Problem List Items Addressed This Visit        Respiratory    Bronchitis     Patient's symptoms are consistent with acute bronchitis  Patient was advised to continue Symbicort daily and ProAir as needed  Patient will also be started on azithromycin and prednisone taper  Relevant Medications    azithromycin (Zithromax) 250 mg tablet    predniSONE 10 mg tablet    fluticasone (FLONASE) 50 mcg/act nasal spray    Acute non-recurrent maxillary sinusitis - Primary     Azithromycin and prednisone taper ordered to treat sinusitis  Patient was advised to begin using Allegra daily that she has on hand  Flonase was also ordered to be used daily  Relevant Medications    azithromycin (Zithromax) 250 mg tablet    predniSONE 10 mg tablet    fluticasone (FLONASE) 50 mcg/act nasal spray        Sinusitis   WHAT YOU NEED TO KNOW:   Sinusitis is inflammation or infection of your sinuses  Sinusitis is most often caused by a virus  Acute sinusitis may last up to 12 weeks  Chronic sinusitis lasts longer than 12 weeks  Recurrent sinusitis means you have 4 or more infections in 1 year  DISCHARGE INSTRUCTIONS:   Return to the emergency department if:   · You have trouble breathing or wheezing that is getting worse  · You have a stiff neck, a fever, or a bad headache  · You cannot open your eye  · Your eyeball bulges out or you cannot move your eye  · You are more sleepy than normal, or you notice changes in your ability to think, move, or talk  · You have swelling of your forehead or scalp  Call your doctor if:   · You have vision changes, such as double vision  · Your eye and eyelid are red, swollen, and painful  · Your symptoms do not improve or go away after 10 days  · You have nausea and are vomiting  · Your nose is bleeding  · You have questions or concerns about your condition or care  Medicines: Your symptoms may go away on their own   Your healthcare provider may recommend watchful waiting for up to 10 days before starting antibiotics  You may need any of the following:  · Acetaminophen  decreases pain and fever  It is available without a doctor's order  Ask how much to take and how often to take it  Follow directions  Read the labels of all other medicines you are using to see if they also contain acetaminophen, or ask your doctor or pharmacist  Acetaminophen can cause liver damage if not taken correctly  Do not use more than 4 grams (4,000 milligrams) total of acetaminophen in one day  · NSAIDs , such as ibuprofen, help decrease swelling, pain, and fever  This medicine is available with or without a doctor's order  NSAIDs can cause stomach bleeding or kidney problems in certain people  If you take blood thinner medicine, always ask your healthcare provider if NSAIDs are safe for you  Always read the medicine label and follow directions  · Nasal steroid sprays  may help decrease inflammation in your nose and sinuses  · Decongestants  help reduce swelling and drain mucus in the nose and sinuses  They may help you breathe easier  · Antihistamines  help dry mucus in the nose and relieve sneezing  · Antibiotics  help treat or prevent a bacterial infection  · Take your medicine as directed  Contact your healthcare provider if you think your medicine is not helping or if you have side effects  Tell him or her if you are allergic to any medicine  Keep a list of the medicines, vitamins, and herbs you take  Include the amounts, and when and why you take them  Bring the list or the pill bottles to follow-up visits  Carry your medicine list with you in case of an emergency  Self-care:   · Rinse your sinuses as directed  Use a sinus rinse device to rinse your nasal passages with a saline (salt water) solution or distilled water  Do not use tap water  This will help thin the mucus in your nose and rinse away pollen and dirt   It will also help reduce swelling so you can breathe normally  · Use a humidifier  to increase air moisture in your home  This may make it easier for you to breathe and help decrease your cough  · Sleep with your head elevated  Place an extra pillow under your head before you go to sleep to help your sinuses drain  · Drink liquids as directed  Ask your healthcare provider how much liquid to drink each day and which liquids are best for you  Liquids will thin the mucus in your nose and help it drain  Avoid drinks that contain alcohol or caffeine  · Do not smoke, and avoid secondhand smoke  Nicotine and other chemicals in cigarettes and cigars can make your symptoms worse  Ask your healthcare provider for information if you currently smoke and need help to quit  E-cigarettes or smokeless tobacco still contain nicotine  Talk to your healthcare provider before you use these products  Prevent the spread of germs:   · Wash your hands often with soap and water  Wash your hands after you use the bathroom, change a child's diaper, or sneeze  Wash your hands before you prepare or eat food  · Stay away from people who are sick  Some germs spread easily and quickly through contact  Follow up with your doctor as directed: You may be referred to an ear, nose, and throat specialist  Write down your questions so you remember to ask them during your visits  © Copyright [employer] 2022 Information is for End User's use only and may not be sold, redistributed or otherwise used for commercial purposes  All illustrations and images included in CareNotes® are the copyrighted property of [employer]  or Nena Becker   The above information is an  only  It is not intended as medical advice for individual conditions or treatments  Talk to your doctor, nurse or pharmacist before following any medical regimen to see if it is safe and effective for you

## 2022-03-01 NOTE — PROGRESS NOTES
COVID-19 Outpatient Progress Note    Assessment/Plan:    Problem List Items Addressed This Visit     None         COVID-19 Plan   Encounter provider ARACELI Cabello    Provider located at [address] [facility]  [address] [facility] [address] [place] [id number] [phone]    Recent Visits  No visits were found meeting these conditions  Showing recent visits within past 7 days and meeting all other requirements  Today's Visits  Date Type Provider Dept   03/01/22 Telemedicine Marcia Tejada [facility] Primary Care   Showing today's visits and meeting all other requirements  Future Appointments  No visits were found meeting these conditions  Showing future appointments within next 150 days and meeting all other requirements     COVID-19 HPI  No results found for: [address] [facility] [address], [address] [facility], [address] [facility], [facility] [address], 350 [facility], 700 [facility]  No past medical history on file  No past surgical history on file  Current Outpatient Medications   Medication Sig Dispense Refill    ALPRAZolam (XANAX) 0 25 mg tablet 1/2 TO 2 TABS AS NEEDED FOR TRAVEL ANXIETY  30 tablet 0    budesonide-formoterol (Symbicort) 80-4 5 MCG/ACT inhaler       Cryselle-28 0 3-30 MG-MCG per tablet TAKE 1 TABLET BY MOUTH EVERY DAY 84 tablet 3    PROAIR  (90 Base) MCG/ACT inhaler       sertraline (ZOLOFT) 100 mg tablet Take 1 tablet (100 mg total) by mouth daily 90 tablet 3    sertraline (ZOLOFT) 50 mg tablet Take 1 tablet (50 mg total) by mouth daily TAKE DAILY WITH 100 MG ZOLOFT TO EQUAL 150 MG DAILY  90 tablet 3     No current facility-administered medications for this visit       Allergies   Allergen Reactions    Pseudoephedrine Shortness Of Breath and Throat Swelling    Pseudoephedrine-Guaifenesin Er Hyperactivity    Rondec-D [Chlophedianol-Pseudoephedrine]      Cough syrup;hyperactive    Essence Garcia (Corn Pollen) Allergy Skin Test Hives, Itching, Rash and Swelling       Review of Systems  Objective:    Vitals:    03/01/22 0928   Temp: 98 7 °F (37 1 °C)   TempSrc: Temporal   Weight: (!) 152 kg (334 lb)       Physical Exam    VIRTUAL VISIT DISCLAIMER    Caio Aragon verbally agrees to participate in [employer]  Pt is aware that [employer] could be limited without vital signs or the ability to perform a full hands-on physical exam  Neelima Lambert understands she or the provider may request at any time to terminate the video visit and request the patient to seek care or treatment in person

## 2022-03-01 NOTE — ASSESSMENT & PLAN NOTE
Azithromycin and prednisone taper ordered to treat sinusitis  Patient was advised to begin using Allegra daily that she has on hand  Flonase was also ordered to be used daily

## 2022-03-01 NOTE — ASSESSMENT & PLAN NOTE
Patient's symptoms are consistent with acute bronchitis  Patient was advised to continue Symbicort daily and ProAir as needed  Patient will also be started on azithromycin and prednisone taper

## 2022-03-01 NOTE — PROGRESS NOTES
Virtual Regular Visit    Verification of patient location:    Patient is located in the following state in which I hold an active license [place]      Assessment/Plan:    Problem List Items Addressed This Visit        Respiratory    Bronchitis     Patient's symptoms are consistent with acute bronchitis  Patient was advised to continue Symbicort daily and ProAir as needed  Patient will also be started on azithromycin and prednisone taper  Relevant Medications    azithromycin (Zithromax) 250 mg tablet    predniSONE 10 mg tablet    fluticasone (FLONASE) 50 mcg/act nasal spray    Acute non-recurrent maxillary sinusitis - Primary     Azithromycin and prednisone taper ordered to treat sinusitis  Patient was advised to begin using Allegra daily that she has on hand  Flonase was also ordered to be used daily  Relevant Medications    azithromycin (Zithromax) 250 mg tablet    predniSONE 10 mg tablet    fluticasone (FLONASE) 50 mcg/act nasal spray               Reason for visit is   Chief Complaint   Patient presents with    Nasal Congestion     all sx's for about a week and a half   mgb    Sore Throat    Headache    Earache     right ear is clogged    COVID-19    Virtual Regular Visit        Encounter provider ARACELI Gonzalez    Provider located at [address] [address] [phone]      Recent Visits  No visits were found meeting these conditions  Showing recent visits within past 7 days and meeting all other requirements  Today's Visits  Date Type Provider Dept   03/01/22 Telemedicine Marcia Brooks [facility]   Showing today's visits and meeting all other requirements  Future Appointments  No visits were found meeting these conditions  Showing future appointments within next 150 days and meeting all other requirements       The patient was identified by name and date of birth   Saleem Simeon was informed that this is a telemedicine visit and that the visit is being conducted through [address] [place] [address] [facility] [employer] and patient was informed that this is a secure, HIPAA-compliant platform  She agrees to proceed     My office door was closed  No one else was in the room  She acknowledged consent and understanding of privacy and security of the video platform  The patient has agreed to participate and understands they can discontinue the visit at any time  Patient is aware this is a billable service  Subjective  Barby Ruth is a 28 y o  female    Patient is reporting symptoms of right ear congestion, right maxillary sinus pressure and congestion, intermittent productive cough, rhinorrhea, nasal congestion, sore throat, headaches, and increased fatigue  Patient is also reporting increased shortness of breath since her symptoms began  Patient was never diagnosed with asthma but was prescribed Symbicort in the past to be used when she is having respiratory symptoms  Patient reports that she has been using Symbicort daily but this has not been improving her symptoms much  She reports she has also been using ProAir about every 4 hours  Patient reports that shortness of breath occurs with activity and not with rest and is resolved quickly with rest   Patient denies fever, chills, diarrhea, nausea, vomiting, abdominal pain, myalgias, or loss of smell or taste  Patient is fully vaccinated against COVID and does have the booster dose  Patient denies any COVID exposures in the past 2 weeks  Patient reports she has been having symptoms for 10 days at this point  No past medical history on file  No past surgical history on file  Current Outpatient Medications   Medication Sig Dispense Refill    ALPRAZolam (XANAX) 0 25 mg tablet 1/2 TO 2 TABS AS NEEDED FOR TRAVEL ANXIETY   30 tablet 0    budesonide-formoterol (Symbicort) 80-4 5 MCG/ACT inhaler       Cryselle-28 0 3-30 MG-MCG per tablet TAKE 1 TABLET BY MOUTH EVERY DAY 84 tablet 3    PROAIR  (90 Base) MCG/ACT inhaler       sertraline (ZOLOFT) 100 mg tablet Take 1 tablet (100 mg total) by mouth daily 90 tablet 3    sertraline (ZOLOFT) 50 mg tablet Take 1 tablet (50 mg total) by mouth daily TAKE DAILY WITH 100 MG ZOLOFT TO EQUAL 150 MG DAILY  90 tablet 3    azithromycin (Zithromax) 250 mg tablet Take 2 tablets (500 mg total) by mouth daily for 1 day, THEN 1 tablet (250 mg total) daily for 4 days  6 tablet 0    fluticasone (FLONASE) 50 mcg/act nasal spray 1 spray into each nostril daily 11 1 mL 3    predniSONE 10 mg tablet Use 5 tablets for 2 days  Use 4 tablets for 2 days  Use 3 tablets for 2 days  Use 2 tablets for 2 days  Use 1 tablet for 2 days  30 tablet 0     No current facility-administered medications for this visit  Allergies   Allergen Reactions    Pseudoephedrine Shortness Of Breath and Throat Swelling    Pseudoephedrine-Guaifenesin Er Hyperactivity    Rondec-D [Chlophedianol-Pseudoephedrine]      Cough syrup;hyperactive    Essence Garcia (Corn Pollen) Allergy Skin Test Hives, Itching, Rash and Swelling       Review of Systems   Constitutional: Positive for fatigue  Negative for chills and fever  HENT: Positive for congestion, postnasal drip, rhinorrhea, sinus pressure, sinus pain and sore throat  Negative for ear discharge and ear pain  Right ear congestion   Eyes: Negative for pain and visual disturbance  Respiratory: Positive for cough (intermittent-productive ), chest tightness and shortness of breath  Cardiovascular: Negative for chest pain and palpitations  Gastrointestinal: Negative for abdominal pain, constipation, diarrhea, nausea and vomiting  Endocrine: Negative for cold intolerance and heat intolerance  Genitourinary: Negative for decreased urine volume, dysuria and hematuria  Musculoskeletal: Negative for arthralgias, back pain and myalgias  Skin: Negative for color change and rash     Allergic/Immunologic: Negative for environmental allergies  Neurological: Positive for headaches  Negative for dizziness, seizures, syncope, weakness and numbness  Hematological: Negative for adenopathy  Psychiatric/Behavioral: Negative for confusion  The patient is not nervous/anxious  All other systems reviewed and are negative  Video Exam    Vitals:    03/01/22 0928   Temp: 98 7 °F (37 1 °C)   TempSrc: Temporal   Weight: (!) 152 kg (334 lb)       Physical Exam  Vitals reviewed: limited due to [employer] exam    Constitutional:       General: She is not in acute distress  Appearance: She is well-developed  She is not ill-appearing  Neurological:      Mental Status: She is alert  I spent 15 minutes directly with the patient during this visit    VIRTUAL VISIT Edgar verbally agrees to participate in [facility]  Pt is aware that [facility] could be limited without vital signs or the ability to perform a full hands-on physical exam  Neelima Ellsworth understands she or the provider may request at any time to terminate the video visit and request the patient to seek care or treatment in person

## 2022-03-21 ENCOUNTER — ANNUAL EXAM (OUTPATIENT)
Dept: OBGYN CLINIC | Facility: MEDICAL CENTER | Age: 32
End: 2022-03-21
Payer: COMMERCIAL

## 2022-03-21 VITALS
BODY MASS INDEX: 53.92 KG/M2 | HEIGHT: 62 IN | WEIGHT: 293 LBS | DIASTOLIC BLOOD PRESSURE: 70 MMHG | SYSTOLIC BLOOD PRESSURE: 110 MMHG

## 2022-03-21 DIAGNOSIS — Z12.39 ENCOUNTER FOR SCREENING FOR MALIGNANT NEOPLASM OF BREAST, UNSPECIFIED SCREENING MODALITY: ICD-10-CM

## 2022-03-21 DIAGNOSIS — Z01.419 ENCOUNTER FOR WELL WOMAN EXAM WITH ROUTINE GYNECOLOGICAL EXAM: Primary | ICD-10-CM

## 2022-03-21 DIAGNOSIS — N91.4 SECONDARY OLIGOMENORRHEA: ICD-10-CM

## 2022-03-21 PROCEDURE — G0145 SCR C/V CYTO,THINLAYER,RESCR: HCPCS | Performed by: OBSTETRICS & GYNECOLOGY

## 2022-03-21 PROCEDURE — G0476 HPV COMBO ASSAY CA SCREEN: HCPCS | Performed by: OBSTETRICS & GYNECOLOGY

## 2022-03-21 PROCEDURE — 1036F TOBACCO NON-USER: CPT | Performed by: OBSTETRICS & GYNECOLOGY

## 2022-03-21 PROCEDURE — 99395 PREV VISIT EST AGE 18-39: CPT | Performed by: OBSTETRICS & GYNECOLOGY

## 2022-03-21 PROCEDURE — 3008F BODY MASS INDEX DOCD: CPT | Performed by: OBSTETRICS & GYNECOLOGY

## 2022-03-21 RX ORDER — NORGESTREL-ETHINYL ESTRADIOL 0.3-0.03MG
1 TABLET ORAL DAILY
Qty: 84 TABLET | Refills: 3 | Status: SHIPPED | OUTPATIENT
Start: 2022-03-21

## 2022-03-21 NOTE — PROGRESS NOTES
Assessment   28 y o  Giulia Roque with normal well woman exam  Patient declines STD testing today    Plan   - Pap collected today  - OCP script sent to pharmacy  - RTC for annual or sooner as indicated  __________________________________________________________________      Fidencio Nesbitt is a 28 y o  Giulia Roque with regular menses who is not sexually active and currently using contraception (OCP) presenting for annual exam  She is without complaint  GYN  Complaints: denies  Denies dyspareunia, genital discharge, genital ulcers and pelvic pain  Periods are regular every 28-30 days,  Dysmenorrhea:none  Cyclic symptoms include none  Sexually active: No  Hx STI: denies   Hx Abnormal pap: denies  Last pap: 2018    OB     Pregnancy complications: denies  Desires future fertility      Complaints: denies  Denies dysuria, hematuria and urinary frequency/urgency    BREAST  Complaints: denies  Denies: breast tenderness, nipple discharge and rash  Last mammogram: not indicated  Family hx: denies fhx of breast, uterine, ovarian, or colon cancers    GENERAL  PMH reviewed/updated and is as below  Patient does follow with a PCP  Works as a pharmacy tech  Denies domestic violence  Exercise: discussed 30 minutes of activity daily  EtOH: occasionally  Drug use: none    SCREENING  Cervical Ca: pap done today  Breast Ca: not yet indicated  Colon Ca: not yet indicated      History reviewed  No pertinent past medical history  History reviewed  No pertinent surgical history        Current Outpatient Medications:     ALPRAZolam (XANAX) 0 25 mg tablet, 1/2 TO 2 TABS AS NEEDED FOR TRAVEL ANXIETY , Disp: 30 tablet, Rfl: 0    budesonide-formoterol (Symbicort) 80-4 5 MCG/ACT inhaler, , Disp: , Rfl:     fluticasone (FLONASE) 50 mcg/act nasal spray, 1 spray into each nostril daily, Disp: 11 1 mL, Rfl: 3    norgestrel-ethinyl estradiol (Cryselle-28) 0 3 mg-30 mcg per tablet, Take 1 tablet by mouth daily, Disp: 84 tablet, Rfl: 3    PROAIR  (90 Base) MCG/ACT inhaler, , Disp: , Rfl:     sertraline (ZOLOFT) 100 mg tablet, Take 1 tablet (100 mg total) by mouth daily, Disp: 90 tablet, Rfl: 3    sertraline (ZOLOFT) 50 mg tablet, Take 1 tablet (50 mg total) by mouth daily TAKE DAILY WITH 100 MG ZOLOFT TO EQUAL 150 MG DAILY  , Disp: 90 tablet, Rfl: 3    Allergies   Allergen Reactions    Pseudoephedrine Shortness Of Breath and Throat Swelling    Pseudoephedrine-Guaifenesin Er Hyperactivity    Rondec-D [Chlophedianol-Pseudoephedrine]      Cough syrup;hyperactive    Essence Garcia (Corn Pollen) Allergy Skin Test Hives, Itching, Rash and Swelling       Social History     Socioeconomic History    Marital status: Single     Spouse name: Not on file    Number of children: Not on file    Years of education: Not on file    Highest education level: Not on file   Occupational History    Not on file   Tobacco Use    Smoking status: Never Smoker    Smokeless tobacco: Never Used   Vaping Use    Vaping Use: Never used   Substance and Sexual Activity    Alcohol use: Yes     Comment: occasionally    Drug use: Never    Sexual activity: Not Currently     Birth control/protection: OCP   Other Topics Concern    Not on file   Social History Narrative    Not on file     Social Determinants of Health     Financial Resource Strain: Not on file   Food Insecurity: Not on file   Transportation Needs: Not on file   Physical Activity: Not on file   Stress: Not on file   Social Connections: Not on file   Intimate Partner Violence: Not on file   Housing Stability: Not on file            Objective      /70 (BP Location: Right arm, Patient Position: Sitting, Cuff Size: Adult)   Ht 5' 2" (1 575 m)   Wt (!) 151 kg (332 lb)   LMP 03/17/2022   BMI 60 72 kg/m²     General:   alert and oriented, in no acute distress, appears stated age and cooperative; obese   Vulva: normal   Vagina: normal mucosa, normal discharge   Cervix: Unable to visualize due to patient discomfort and body habitus   Uterus: Limited secondary to patient body habitus   Adnexa: Limited secondary to patient body habitus         Agnes Zarate MD, PGY-4  3/21/2022  9:59 AM

## 2022-03-22 LAB
HPV HR 12 DNA CVX QL NAA+PROBE: NEGATIVE
HPV16 DNA CVX QL NAA+PROBE: NEGATIVE
HPV18 DNA CVX QL NAA+PROBE: NEGATIVE

## 2022-03-24 LAB
LAB AP GYN PRIMARY INTERPRETATION: NORMAL
Lab: NORMAL

## 2022-04-27 DIAGNOSIS — F40.243 ANXIETY WITH FLYING: ICD-10-CM

## 2022-04-27 RX ORDER — ALPRAZOLAM 0.25 MG/1
TABLET ORAL
Qty: 30 TABLET | Refills: 0 | Status: SHIPPED | OUTPATIENT
Start: 2022-04-27

## 2022-05-24 ENCOUNTER — OFFICE VISIT (OUTPATIENT)
Dept: FAMILY MEDICINE CLINIC | Facility: CLINIC | Age: 32
End: 2022-05-24
Payer: COMMERCIAL

## 2022-05-24 VITALS
SYSTOLIC BLOOD PRESSURE: 122 MMHG | BODY MASS INDEX: 53.92 KG/M2 | OXYGEN SATURATION: 98 % | TEMPERATURE: 99 F | HEART RATE: 106 BPM | HEIGHT: 62 IN | DIASTOLIC BLOOD PRESSURE: 80 MMHG | WEIGHT: 293 LBS

## 2022-05-24 DIAGNOSIS — B34.9 VIRAL INFECTION, UNSPECIFIED: Primary | ICD-10-CM

## 2022-05-24 PROCEDURE — U0005 INFEC AGEN DETEC AMPLI PROBE: HCPCS | Performed by: PHYSICIAN ASSISTANT

## 2022-05-24 PROCEDURE — 3008F BODY MASS INDEX DOCD: CPT | Performed by: PHYSICIAN ASSISTANT

## 2022-05-24 PROCEDURE — U0003 INFECTIOUS AGENT DETECTION BY NUCLEIC ACID (DNA OR RNA); SEVERE ACUTE RESPIRATORY SYNDROME CORONAVIRUS 2 (SARS-COV-2) (CORONAVIRUS DISEASE [COVID-19]), AMPLIFIED PROBE TECHNIQUE, MAKING USE OF HIGH THROUGHPUT TECHNOLOGIES AS DESCRIBED BY CMS-2020-01-R: HCPCS | Performed by: PHYSICIAN ASSISTANT

## 2022-05-24 PROCEDURE — 99213 OFFICE O/P EST LOW 20 MIN: CPT | Performed by: PHYSICIAN ASSISTANT

## 2022-05-24 PROCEDURE — 1036F TOBACCO NON-USER: CPT | Performed by: PHYSICIAN ASSISTANT

## 2022-05-24 NOTE — PATIENT INSTRUCTIONS
Problem List Items Addressed This Visit          Other    Viral infection, unspecified - Primary     Day 5 of symptoms  Patient to continue using over-the-counter medications however I would ache encouraged her to take her rescue inhaler 3 times a day to decrease the coughing which is most likely secondary to asthma exacerbation due to her current symptoms  A COVID only swab was placed and performed today call with results tomorrow  Increase fluids symptomatic treatment  At home 3 COVID tests were negative to date             Relevant Orders    COVID Only - Office Collect

## 2022-05-24 NOTE — ASSESSMENT & PLAN NOTE
Day 5 of symptoms  Patient to continue using over-the-counter medications however I would ache encouraged her to take her rescue inhaler 3 times a day to decrease the coughing which is most likely secondary to asthma exacerbation due to her current symptoms  A COVID only swab was placed and performed today call with results tomorrow  Increase fluids symptomatic treatment  At home 3 COVID tests were negative to date

## 2022-05-24 NOTE — PROGRESS NOTES
Assessment and Plan:    Problem List Items Addressed This Visit        Other    Viral infection, unspecified - Primary     Day 5 of symptoms  Patient to continue using over-the-counter medications however I would ache encouraged her to take her rescue inhaler 3 times a day to decrease the coughing which is most likely secondary to asthma exacerbation due to her current symptoms  A COVID only swab was placed and performed today call with results tomorrow  Increase fluids symptomatic treatment  At home 3 COVID tests were negative to date  Relevant Orders    COVID Only - Office Collect                 Diagnoses and all orders for this visit:    Viral infection, unspecified  -     COVID Only - Office Collect            Subjective:      Patient ID: Nida Parker is a 33 y o  female  CC:    Chief Complaint   Patient presents with    Sore Throat     Patient complaints of sore throat, dry cough, stuffy nose and bilateral ear pain for the past 5 days  HPI:    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  She also states that lots of coworkers at work are sick as well  She does have a propensity to get sinus infections  She states the worst is her sore throat and her cough  Cough is nonproductive she has no colored mucus from her nose or cough she is taking her Symbicort and Flonase as usual but has not been tried her rescue inhaler  No fevers  Three home COVID tests were negative all by different manufactures so far with these current symptoms  She has been sick for 5 days now  Earache   There is pain in both ears  This is a new problem  The current episode started in the past 7 days  The problem occurs hourly  The problem has been gradually worsening  There has been no fever  The pain is at a severity of 8/10  Associated symptoms include coughing and a sore throat   Pertinent negatives include no abdominal pain, diarrhea, ear discharge, headaches, hearing loss, neck pain, rash, rhinorrhea or vomiting  The following portions of the patient's history were reviewed and updated as appropriate: allergies, current medications, past family history, past medical history, past social history, past surgical history and problem list       Review of Systems   Constitutional: Negative  HENT: Positive for ear pain and sore throat  Negative for ear discharge, hearing loss and rhinorrhea  Eyes: Negative  Respiratory: Positive for cough  Cardiovascular: Negative  Gastrointestinal: Negative  Negative for abdominal pain, diarrhea and vomiting  Endocrine: Negative  Genitourinary: Negative  Musculoskeletal: Negative  Negative for neck pain  Skin: Negative  Negative for rash  Allergic/Immunologic: Negative  Neurological: Negative  Negative for headaches  Hematological: Negative  Psychiatric/Behavioral: Negative  Data to review:       Objective:    Vitals:    05/24/22 1505   BP: 122/80   BP Location: Right arm   Patient Position: Sitting   Cuff Size: Large   Pulse: (!) 106   Temp: 99 °F (37 2 °C)   TempSrc: Tympanic   SpO2: 98%   Weight: (!) 148 kg (327 lb)   Height: 5' 2" (1 575 m)        Physical Exam  Vitals and nursing note reviewed  Constitutional:       Appearance: Normal appearance  She is well-developed  HENT:      Head: Normocephalic and atraumatic  Right Ear: Hearing, tympanic membrane, ear canal and external ear normal       Left Ear: Hearing, tympanic membrane, ear canal and external ear normal       Nose: Nose normal       Mouth/Throat:      Pharynx: Oropharynx is clear  Comments: Tongue has a posterior brown hue to it as the patient was a smoker  Eyes:      General: Lids are normal       Conjunctiva/sclera: Conjunctivae normal       Pupils: Pupils are equal, round, and reactive to light  Cardiovascular:      Rate and Rhythm: Normal rate and regular rhythm  Heart sounds: No murmur heard    Pulmonary: Effort: Pulmonary effort is normal       Breath sounds: Normal breath sounds  Lymphadenopathy:      Cervical: Cervical adenopathy present  Right cervical: Superficial cervical adenopathy present  Left cervical: Superficial cervical adenopathy present  Skin:     General: Skin is warm and dry  Neurological:      General: No focal deficit present  Mental Status: She is alert  Coordination: Coordination is intact  Psychiatric:         Mood and Affect: Mood normal          Behavior: Behavior normal  Behavior is cooperative  Thought Content:  Thought content normal          Judgment: Judgment normal

## 2022-05-25 LAB — SARS-COV-2 RNA RESP QL NAA+PROBE: NEGATIVE

## 2022-05-26 DIAGNOSIS — J40 BRONCHITIS: Primary | ICD-10-CM

## 2022-05-26 RX ORDER — CEFUROXIME AXETIL 500 MG/1
500 TABLET ORAL EVERY 12 HOURS SCHEDULED
Qty: 20 TABLET | Refills: 0 | Status: SHIPPED | OUTPATIENT
Start: 2022-05-26 | End: 2022-06-05

## 2022-05-26 NOTE — RESULT ENCOUNTER NOTE
Negative covid  I will call in ceftin 500 mg for pt to take twice daily for 10 days and she can continue to use OTC cold/cough products as well  Pt was notified through [employer]  Note for work given

## 2022-06-09 DIAGNOSIS — J40 BRONCHITIS: Primary | ICD-10-CM

## 2022-06-09 RX ORDER — PROMETHAZINE HYDROCHLORIDE AND CODEINE PHOSPHATE 6.25; 1 MG/5ML; MG/5ML
5 SYRUP ORAL EVERY 4 HOURS PRN
Qty: 118 ML | Refills: 0 | Status: SHIPPED | OUTPATIENT
Start: 2022-06-09 | End: 2022-06-19

## 2022-06-18 ENCOUNTER — OFFICE VISIT (OUTPATIENT)
Dept: URGENT CARE | Facility: MEDICAL CENTER | Age: 32
End: 2022-06-18
Payer: COMMERCIAL

## 2022-06-18 VITALS
WEIGHT: 293 LBS | SYSTOLIC BLOOD PRESSURE: 169 MMHG | DIASTOLIC BLOOD PRESSURE: 100 MMHG | HEART RATE: 100 BPM | RESPIRATION RATE: 20 BRPM | BODY MASS INDEX: 53.92 KG/M2 | OXYGEN SATURATION: 100 % | TEMPERATURE: 97.8 F | HEIGHT: 62 IN

## 2022-06-18 DIAGNOSIS — J45.901 MODERATE ASTHMA WITH ACUTE EXACERBATION, UNSPECIFIED WHETHER PERSISTENT: Primary | ICD-10-CM

## 2022-06-18 PROCEDURE — G0382 LEV 3 HOSP TYPE B ED VISIT: HCPCS

## 2022-06-18 RX ORDER — PREDNISONE 10 MG/1
50 TABLET ORAL DAILY
Qty: 25 TABLET | Refills: 0 | Status: SHIPPED | OUTPATIENT
Start: 2022-06-18 | End: 2022-06-23

## 2022-06-18 RX ORDER — AZITHROMYCIN 250 MG/1
TABLET, FILM COATED ORAL
Qty: 6 TABLET | Refills: 0 | Status: SHIPPED | OUTPATIENT
Start: 2022-06-18 | End: 2022-06-22

## 2022-06-18 RX ORDER — BENZONATATE 200 MG/1
200 CAPSULE ORAL 3 TIMES DAILY PRN
Qty: 20 CAPSULE | Refills: 0 | Status: SHIPPED | OUTPATIENT
Start: 2022-06-18

## 2022-06-18 NOTE — PROGRESS NOTES
[facility]        NAME: Rupali Vera is a 33 y o  female  : 1990    MRN: [id number]  DATE: 2022  TIME: 10:33 AM    Assessment and Plan   Moderate asthma with acute exacerbation, unspecified whether persistent [J45 901]  1  Moderate asthma with acute exacerbation, unspecified whether persistent  azithromycin (ZITHROMAX) 250 mg tablet    predniSONE 10 mg tablet    benzonatate (TESSALON) 200 MG capsule         Patient Instructions     Start Zpak & Steroid for asthma exac in the s/o recent bronchitis  Tessalon as needed for cough  Follow up with PCP in 2-3 days  Proceed to ER if symptoms worsen  Chief Complaint     Chief Complaint   Patient presents with    URI     She states she started Tuesday with cough, congestion, nasal congestion  She had bronchitis earlier in the month  She has had two negative covid tests at home  Pt has been vaccinated and never testes positive         History of Present Illness     33 y o  F presents with complaint of chest tightness, cough, congestion and nasal congestion x 5 days  States she was treated for Bronchitis with Ceftin on 22  States these symptoms never fully resolved and now have worsened  Denies CP, N/V/D  Denies fevers or chills  Denies sick contacts  Home COVID testing negative  Hx of asthma, using Symbicort with Albuterol PRN  Denies recent travel  No tobacco use  Review of Systems   Review of Systems   Constitutional: Negative for chills, fatigue and fever  HENT: Positive for congestion  Negative for ear pain, facial swelling, hearing loss, rhinorrhea, sinus pressure, sneezing, sore throat and trouble swallowing  Eyes: Negative for pain, redness and visual disturbance  Respiratory: Positive for cough and chest tightness  Negative for shortness of breath and wheezing  Cardiovascular: Negative for chest pain and palpitations  Gastrointestinal: Negative for abdominal pain, diarrhea, nausea and vomiting     Genitourinary: Negative for dysuria, flank pain, hematuria and pelvic pain  Musculoskeletal: Negative for arthralgias, back pain and myalgias  Skin: Negative for color change and rash  Neurological: Negative for dizziness, seizures, syncope, weakness, light-headedness and headaches  Psychiatric/Behavioral: Negative for confusion, hallucinations and sleep disturbance  The patient is not nervous/anxious  All other systems reviewed and are negative  Current Medications       Current Outpatient Medications:     azithromycin (ZITHROMAX) 250 mg tablet, Take 2 tablets today then 1 tablet daily x 4 days, Disp: 6 tablet, Rfl: 0    benzonatate (TESSALON) 200 MG capsule, Take 1 capsule (200 mg total) by mouth 3 (three) times a day as needed for cough, Disp: 20 capsule, Rfl: 0    predniSONE 10 mg tablet, Take 5 tablets (50 mg total) by mouth daily for 5 days, Disp: 25 tablet, Rfl: 0    ALPRAZolam (XANAX) 0 25 mg tablet, 1/2 TO 2 TABS AS NEEDED FOR TRAVEL ANXIETY , Disp: 30 tablet, Rfl: 0    budesonide-formoterol (SYMBICORT) 80-4 5 MCG/ACT inhaler, , Disp: , Rfl:     fluticasone (FLONASE) 50 mcg/act nasal spray, 1 spray into each nostril daily, Disp: 11 1 mL, Rfl: 3    norgestrel-ethinyl estradiol (Cryselle-28) 0 3 mg-30 mcg per tablet, Take 1 tablet by mouth daily, Disp: 84 tablet, Rfl: 3    PROAIR  (90 Base) MCG/ACT inhaler, , Disp: , Rfl:     promethazine-codeine (PHENERGAN WITH CODEINE) 6 25-10 mg/5 mL syrup, Take 5 mL by mouth every 4 (four) hours as needed for cough for up to 10 days, Disp: 118 mL, Rfl: 0    sertraline (ZOLOFT) 100 mg tablet, Take 1 tablet (100 mg total) by mouth daily, Disp: 90 tablet, Rfl: 3    sertraline (ZOLOFT) 50 mg tablet, Take 1 tablet (50 mg total) by mouth daily TAKE DAILY WITH 100 MG ZOLOFT TO EQUAL 150 MG DAILY  , Disp: 90 tablet, Rfl: 3    Current Allergies     Allergies as of 06/18/2022 - Reviewed 06/18/2022   Allergen Reaction Noted    Pseudoephedrine Shortness Of Breath and Throat Swelling 12/13/2016    Pseudoephedrine-guaifenesin er Hyperactivity 12/13/2016    Rondec-d [chlophedianol-pseudoephedrine]  12/03/2019    Essence tatum (corn pollen) allergy skin test Hives, Itching, Rash, and Swelling 08/19/2021            The following portions of the patient's history were reviewed and updated as appropriate: allergies, current medications, past family history, past medical history, past social history, past surgical history and problem list      Past Medical History:   Diagnosis Date    Anxiety     Asthma     Depression        History reviewed  No pertinent surgical history  Family History   Problem Relation Age of Onset    Hypertension Mother     No Known Problems Father     Brain cancer Maternal Grandmother     Colon cancer Maternal Uncle          Medications have been verified  Objective   /100   Pulse 100   Temp 97 8 °F (36 6 °C)   Resp 20   Ht 5' 2" (1 575 m)   Wt (!) 152 kg (334 lb)   LMP 06/12/2022   SpO2 100%   BMI 61 09 kg/m²   Patient's last menstrual period was 06/12/2022  Physical Exam     Physical Exam  Vitals reviewed  Constitutional:       General: She is not in acute distress  Appearance: Normal appearance  She is morbidly obese  She is not toxic-appearing  HENT:      Head: Normocephalic  Right Ear: Tympanic membrane normal  Tympanic membrane is not erythematous or bulging  Left Ear: Tympanic membrane normal  Tympanic membrane is not erythematous or bulging  Nose: No congestion or rhinorrhea  Right Sinus: No maxillary sinus tenderness or frontal sinus tenderness  Left Sinus: No maxillary sinus tenderness or frontal sinus tenderness  Mouth/Throat:      Pharynx: Uvula midline  Posterior oropharyngeal erythema present  No oropharyngeal exudate or uvula swelling  Tonsils: No tonsillar exudate or tonsillar abscesses  Eyes:      Extraocular Movements: Extraocular movements intact  Conjunctiva/sclera: Conjunctivae normal       Pupils: Pupils are equal, round, and reactive to light  Cardiovascular:      Rate and Rhythm: Normal rate and regular rhythm  Pulses: Normal pulses  Heart sounds: Normal heart sounds  Pulmonary:      Effort: Pulmonary effort is normal  No tachypnea, accessory muscle usage or respiratory distress  Breath sounds: Normal air entry  Decreased breath sounds present  No wheezing, rhonchi or rales  Abdominal:      General: Bowel sounds are normal       Palpations: Abdomen is soft  Tenderness: There is no abdominal tenderness  There is no right CVA tenderness or guarding  Musculoskeletal:         General: Normal range of motion  Cervical back: Normal range of motion  Lymphadenopathy:      Cervical: No cervical adenopathy  Skin:     General: Skin is warm and dry  Neurological:      General: No focal deficit present  Mental Status: She is alert  Cranial Nerves: Cranial nerves are intact  Sensory: Sensation is intact  Motor: Motor function is intact  Coordination: Coordination is intact  Gait: Gait is intact  Deep Tendon Reflexes: Reflexes are normal and symmetric

## 2022-06-18 NOTE — PATIENT INSTRUCTIONS
Acute Bronchitis   Start antibiotic and steroid as directed for bronchitis/asthma exacerbation  Follow up with PCP in 2-3 days  Proceed to ER if symptoms worsen  AMBULATORY CARE:   Acute bronchitis  is swelling and irritation in your lungs  It is usually caused by a virus and most often happens in the winter  Bronchitis may also be caused by bacteria or by a chemical irritant, such as smoke  Common symptoms include the following:   Cough that lasts up to 3 weeks, stuffy nose    Hoarseness, sore throat    A fever and chills    Feeling more tired than usual, and body aches    Wheezing or pain when you breathe or cough    Seek care immediately if:   You cough up blood  Your lips or fingernails turn blue  You feel like you are not getting enough air when you breathe  Call your doctor if:   Your symptoms do not go away or get worse, even after treatment  Your cough does not get better within 4 weeks  You have questions or concerns about your condition or care  Medicines: You may  need any of the following:  Cough suppressants  decrease your urge to cough  Decongestants  help loosen mucus in your lungs and make it easier to cough up  This can help you breathe easier  Inhalers  may be given  Your healthcare provider may give you one or more inhalers to help you breathe easier and cough less  An inhaler gives your medicine to open your airways  Ask your healthcare provider to show you how to use your inhaler correctly  Antibiotics  may be given for up to 5 days if your bronchitis is caused by bacteria  Acetaminophen  decreases pain and fever  It is available without a doctor's order  Ask how much to take and how often to take it  Follow directions  Read the labels of all other medicines you are using to see if they also contain acetaminophen, or ask your doctor or pharmacist  Acetaminophen can cause liver damage if not taken correctly   Do not use more than 4 grams (4,000 milligrams) total of acetaminophen in one day  NSAIDs  help decrease swelling and pain or fever  This medicine is available with or without a doctor's order  NSAIDs can cause stomach bleeding or kidney problems in certain people  If you take blood thinner medicine, always ask your healthcare provider if NSAIDs are safe for you  Always read the medicine label and follow directions  Take your medicine as directed  Contact your healthcare provider if you think your medicine is not helping or if you have side effects  Tell him of her if you are allergic to any medicine  Keep a list of the medicines, vitamins, and herbs you take  Include the amounts, and when and why you take them  Bring the list or the pill bottles to follow-up visits  Carry your medicine list with you in case of an emergency  Self-care:   Drink liquids as directed  You may need to drink more liquids than usual to stay hydrated  Ask how much liquid to drink each day and which liquids are best for you  Use a cool mist humidifier  to increase air moisture in your home  This may make it easier for you to breathe and help decrease your cough  Get more rest   Rest helps your body to heal  Slowly start to do more each day  Rest when you feel it is needed  Avoid irritants in the air  Avoid chemicals, fumes, and dust  Wear a face mask if you must work around dust or fumes  Stay inside on days when air pollution levels are high  If you have allergies, stay inside when pollen counts are high  Do not use aerosol products, such as spray-on deodorant, bug spray, and hair spray  Do not smoke or be around others who are smoking  Nicotine and other chemicals in cigarettes and cigars can cause lung damage  Ask your healthcare provider for information if you currently smoke and need help to quit  E-cigarettes or smokeless tobacco still contain nicotine  Talk to your healthcare provider before you use these products      Prevent acute bronchitis:       Ask about vaccines you may need  Get a flu vaccine each year as soon as recommended, usually in September or October  Ask your healthcare provider if you should also get a pneumonia or COVID-19 vaccine  Your healthcare provider can tell you if you should also get other vaccines, and when to get them  Prevent the spread of germs  You can decrease your risk for acute bronchitis and other illnesses by doing the following:    Wash your hands often with soap and water  Carry germ-killing hand lotion or gel with you  You can use the lotion or gel to clean your hands when soap and water are not available  Do not touch your eyes, nose, or mouth unless you have washed your hands first     Always cover your mouth when you cough to prevent the spread of germs  It is best to cough into a tissue or your shirt sleeve instead of into your hand  Ask those around you to cover their mouths when they cough  Try to avoid people who have a cold or the flu  If you are sick, stay away from others as much as possible  Follow up with your doctor as directed:  Write down questions you have so you will remember to ask them during your follow-up visits  © Copyright [employer] 2022 Information is for End User's use only and may not be sold, redistributed or otherwise used for commercial purposes  All illustrations and images included in CareNotes® are the copyrighted property of [employer]  or Nena Stakr  The above information is an  only  It is not intended as medical advice for individual conditions or treatments  Talk to your doctor, nurse or pharmacist before following any medical regimen to see if it is safe and effective for you

## 2022-07-14 ENCOUNTER — APPOINTMENT (OUTPATIENT)
Dept: LAB | Facility: CLINIC | Age: 32
End: 2022-07-14

## 2022-07-14 ENCOUNTER — OCCMED (OUTPATIENT)
Dept: URGENT CARE | Facility: CLINIC | Age: 32
End: 2022-07-14

## 2022-07-14 DIAGNOSIS — Z00.00 ENCOUNTER FOR PHYSICAL EXAMINATION: ICD-10-CM

## 2022-07-14 DIAGNOSIS — Z00.00 ENCOUNTER FOR PHYSICAL EXAMINATION: Primary | ICD-10-CM

## 2022-07-14 LAB — RUBV IGG SERPL IA-ACNC: 32.1 IU/ML

## 2022-07-14 PROCEDURE — 86765 RUBEOLA ANTIBODY: CPT

## 2022-07-14 PROCEDURE — 36415 COLL VENOUS BLD VENIPUNCTURE: CPT

## 2022-07-14 PROCEDURE — 86787 VARICELLA-ZOSTER ANTIBODY: CPT

## 2022-07-14 PROCEDURE — 86480 TB TEST CELL IMMUN MEASURE: CPT

## 2022-07-14 PROCEDURE — 86762 RUBELLA ANTIBODY: CPT

## 2022-07-14 PROCEDURE — 86735 MUMPS ANTIBODY: CPT

## 2022-07-15 LAB
MEV IGG SER QL IA: NORMAL
MUV IGG SER QL IA: NORMAL
VZV IGG SER QL IA: NORMAL

## 2022-07-17 LAB
GAMMA INTERFERON BACKGROUND BLD IA-ACNC: 0.02 IU/ML
M TB IFN-G BLD-IMP: NEGATIVE
M TB IFN-G CD4+ BCKGRND COR BLD-ACNC: 0 IU/ML
M TB IFN-G CD4+ BCKGRND COR BLD-ACNC: 0 IU/ML
MITOGEN IGNF BCKGRD COR BLD-ACNC: >10 IU/ML

## 2022-10-11 PROBLEM — J01.00 ACUTE NON-RECURRENT MAXILLARY SINUSITIS: Status: RESOLVED | Noted: 2022-03-01 | Resolved: 2022-10-11

## 2023-01-17 ENCOUNTER — AMB VIDEO VISIT (OUTPATIENT)
Dept: OTHER | Facility: HOSPITAL | Age: 33
End: 2023-01-17

## 2023-01-17 DIAGNOSIS — J20.9 ACUTE BRONCHITIS, UNSPECIFIED ORGANISM: Primary | ICD-10-CM

## 2023-01-17 PROBLEM — J45.909 ASTHMA: Status: ACTIVE | Noted: 2023-01-17

## 2023-01-17 PROBLEM — B34.9 VIRAL INFECTION, UNSPECIFIED: Status: RESOLVED | Noted: 2022-05-24 | Resolved: 2023-01-17

## 2023-01-17 PROBLEM — J40 BRONCHITIS: Status: RESOLVED | Noted: 2021-08-24 | Resolved: 2023-01-17

## 2023-01-17 RX ORDER — PREDNISONE 20 MG/1
TABLET ORAL
Qty: 13 TABLET | Refills: 0 | Status: SHIPPED | OUTPATIENT
Start: 2023-01-17 | End: 2023-01-24

## 2023-01-17 NOTE — CARE ANYWHERE EVISITS
Visit Summary for Feliz Nunez - Gender: Female - Date of Birth: [id number]  Date: [id number] - Duration: 10 minutes  Patient: Feliz Nunez  Provider: Yocasta Alcantara PA-C    Patient Contact Information  Address  [place] [address]  [phone]    Visit Topics  Headache [Added By: Self - 2023-01-17]  Cough  Sore throat  Swollen glands  Congestion  [Added By: Self - 2023-01-17]  Cold [Added By: Self - 2023-01-17]    Triage Questions   What is your current physical address in the event of a medical emergency? Answer []  Are you allergic to any medications? Answer []  Are you now or could you be pregnant? Answer []  Do you have any immune system compromise or chronic lung   disease? Answer []  Do you have any vulnerable family members in the home (infant, pregnant, cancer, elderly)? Answer []     Conversation Transcripts  [0A][0A] [Notification] You are connected with Yocasta Alcantara PA-C, Urgent [facility] [address] [place] is located in [place]  [0A][Notification] Chuygama Mayra has shared health history  Arpita Madera  [0A]    Diagnosis  Acute bronchitis    Procedures  Value: [id number] Code: CPT-4 UNLISTED E&M SERVICE    Medications Prescribed    No prescriptions ordered    Electronically signed by: Connie Silva(NPI [id number])

## 2023-01-17 NOTE — PATIENT INSTRUCTIONS
note in "Letters" section of [facility]  Can print if opened from a web browser    Acute Bronchitis   WHAT YOU NEED TO KNOW:   Acute bronchitis is swelling and irritation in your lungs  It is usually caused by a virus and most often happens in the winter  Bronchitis may also be caused by bacteria or by a chemical irritant, such as smoke  DISCHARGE INSTRUCTIONS:   Return to the emergency department if:   You cough up blood  Your lips or fingernails turn blue  You feel like you are not getting enough air when you breathe  Call your doctor if:   Your symptoms do not go away or get worse, even after treatment  Your cough does not get better within 4 weeks  You have questions or concerns about your condition or care  Medicines: You may  need any of the following:  Cough suppressants  decrease your urge to cough  Decongestants  help loosen mucus in your lungs and make it easier to cough up  This can help you breathe easier  Inhalers  may be given  Your healthcare provider may give you one or more inhalers to help you breathe easier and cough less  An inhaler gives your medicine to open your airways  Ask your healthcare provider to show you how to use your inhaler correctly  Antibiotics  may be given for up to 5 days if your bronchitis is caused by bacteria  Acetaminophen  decreases pain and fever  It is available without a doctor's order  Ask how much to take and how often to take it  Follow directions  Read the labels of all other medicines you are using to see if they also contain acetaminophen, or ask your doctor or pharmacist  Acetaminophen can cause liver damage if not taken correctly  Do not use more than 4 grams (4,000 milligrams) total of acetaminophen in one day  NSAIDs  help decrease swelling and pain or fever  This medicine is available with or without a doctor's order  NSAIDs can cause stomach bleeding or kidney problems in certain people   If you take blood thinner medicine, always ask your healthcare provider if NSAIDs are safe for you  Always read the medicine label and follow directions  Take your medicine as directed  Contact your healthcare provider if you think your medicine is not helping or if you have side effects  Tell him of her if you are allergic to any medicine  Keep a list of the medicines, vitamins, and herbs you take  Include the amounts, and when and why you take them  Bring the list or the pill bottles to follow-up visits  Carry your medicine list with you in case of an emergency  Self-care:   Drink liquids as directed  You may need to drink more liquids than usual to stay hydrated  Ask how much liquid to drink each day and which liquids are best for you  Use a cool mist humidifier  to increase air moisture in your home  This may make it easier for you to breathe and help decrease your cough  Get more rest   Rest helps your body to heal  Slowly start to do more each day  Rest when you feel it is needed  Avoid irritants in the air  Avoid chemicals, fumes, and dust  Wear a face mask if you must work around dust or fumes  Stay inside on days when air pollution levels are high  If you have allergies, stay inside when pollen counts are high  Do not use aerosol products, such as spray-on deodorant, bug spray, and hair spray  Do not smoke or be around others who are smoking  Nicotine and other chemicals in cigarettes and cigars can cause lung damage  Ask your healthcare provider for information if you currently smoke and need help to quit  E-cigarettes or smokeless tobacco still contain nicotine  Talk to your healthcare provider before you use these products  Prevent acute bronchitis:       Ask about vaccines you may need  Get a flu vaccine each year as soon as recommended, usually in September or October  Ask your healthcare provider if you should also get a pneumonia or COVID-19 vaccine   Your healthcare provider can tell you if you should also get other vaccines, and when to get them  Prevent the spread of germs  You can decrease your risk for acute bronchitis and other illnesses by doing the following:     Wash your hands often with soap and water  Carry germ-killing hand lotion or gel with you  You can use the lotion or gel to clean your hands when soap and water are not available  Do not touch your eyes, nose, or mouth unless you have washed your hands first     Always cover your mouth when you cough to prevent the spread of germs  It is best to cough into a tissue or your shirt sleeve instead of into your hand  Ask those around you to cover their mouths when they cough  Try to avoid people who have a cold or the flu  If you are sick, stay away from others as much as possible  Follow up with your doctor as directed:  Write down questions you have so you will remember to ask them during your follow-up visits  © [employer] 2022 Information is for End User's use only and may not be sold, redistributed or otherwise used for commercial purposes  All illustrations and images included in CareNotes® are the copyrighted property of [employer]  or Nena Becker   The above information is an  only  It is not intended as medical advice for individual conditions or treatments  Talk to your doctor, nurse or pharmacist before following any medical regimen to see if it is safe and effective for you

## 2023-01-17 NOTE — PROGRESS NOTES
Video Visit - Cuong Billings 33 y o  female MRN: [id number]    REQUIRED DOCUMENTATION:         1  This service was provided via [facility]  2  Provider located at [address] [place] [id number] [phone]  3  [facility] provider: Chalo Romero PA-C   4  Identify all parties in room with patient during [facility] visit:  No one else  5  After connecting through [place], patient was identified by name and date of birth  Patient was then informed that this was a Telemedicine visit and that the exam was being conducted confidentially over secure lines  My office door was closed  No one else was in the room  Patient acknowledged consent and understanding of privacy and security of the Telemedicine visit  I informed the patient that I have reviewed their record in Epic and presented the opportunity for them to ask any questions regarding the visit today  The patient agreed to participate  VITALS: Heart Rate: 80 BPM, Respiratory Rate: 18 RPM, Temperature Unavailable° F, Blood Pressure Unavailable mmHg, Pulse Ox Unavailable % on RA    HPI  Pt reports congestion, frontal sinus pressure, fatigue, cough which is moving deeper into chest, sore throat, swollen glands causing pain into ears x 3-4 days  Endorses wheezing at night relieved by rescue inhaler  Denies fevers  Works at [employer], so always around sick people  Covid test negative x 2  Physical Exam  Constitutional:       General: She is not in acute distress  Appearance: Normal appearance  She is obese  She is not toxic-appearing  HENT:      Head: Normocephalic and atraumatic  Nose: No rhinorrhea  Mouth/Throat:      Mouth: Mucous membranes are moist    Eyes:      Conjunctiva/sclera: Conjunctivae normal    Cardiovascular:      Rate and Rhythm: Normal rate  Pulmonary:      Effort: Pulmonary effort is normal  No respiratory distress  Breath sounds: No wheezing (no gross audible wheeze through computer)  Musculoskeletal:      Cervical back: Normal range of motion  Skin:     Findings: No rash (on face or neck)  Neurological:      Mental Status: She is alert  Cranial Nerves: No dysarthria or facial asymmetry  Psychiatric:         Mood and Affect: Mood normal          Behavior: Behavior normal          Diagnoses and all orders for this visit:    Acute bronchitis, unspecified organism  -     predniSONE 20 mg tablet; Take 2 5 tablets (50 mg total) by mouth daily for 2 days, THEN 2 tablets (40 mg total) daily for 2 days, THEN 1 5 tablets (30 mg total) daily for 2 days, THEN 1 tablet (20 mg total) daily for 1 day  Patient Instructions     note in "Letters" section of [employer] [facility]  Can print if opened from a web browser    Acute Bronchitis   WHAT YOU NEED TO KNOW:   Acute bronchitis is swelling and irritation in your lungs  It is usually caused by a virus and most often happens in the winter  Bronchitis may also be caused by bacteria or by a chemical irritant, such as smoke  DISCHARGE INSTRUCTIONS:   Return to the emergency department if:   · You cough up blood  · Your lips or fingernails turn blue  · You feel like you are not getting enough air when you breathe  Call your doctor if:   · Your symptoms do not go away or get worse, even after treatment  · Your cough does not get better within 4 weeks  · You have questions or concerns about your condition or care  Medicines: You may  need any of the following:  · Cough suppressants  decrease your urge to cough  · Decongestants  help loosen mucus in your lungs and make it easier to cough up  This can help you breathe easier  · Inhalers  may be given  Your healthcare provider may give you one or more inhalers to help you breathe easier and cough less  An inhaler gives your medicine to open your airways  Ask your healthcare provider to show you how to use your inhaler correctly           · Antibiotics  may be given for up to 5 days if your bronchitis is caused by bacteria  · Acetaminophen  decreases pain and fever  It is available without a doctor's order  Ask how much to take and how often to take it  Follow directions  Read the labels of all other medicines you are using to see if they also contain acetaminophen, or ask your doctor or pharmacist  Acetaminophen can cause liver damage if not taken correctly  Do not use more than 4 grams (4,000 milligrams) total of acetaminophen in one day  · NSAIDs  help decrease swelling and pain or fever  This medicine is available with or without a doctor's order  NSAIDs can cause stomach bleeding or kidney problems in certain people  If you take blood thinner medicine, always ask your healthcare provider if NSAIDs are safe for you  Always read the medicine label and follow directions  · Take your medicine as directed  Contact your healthcare provider if you think your medicine is not helping or if you have side effects  Tell him of her if you are allergic to any medicine  Keep a list of the medicines, vitamins, and herbs you take  Include the amounts, and when and why you take them  Bring the list or the pill bottles to follow-up visits  Carry your medicine list with you in case of an emergency  Self-care:   · Drink liquids as directed  You may need to drink more liquids than usual to stay hydrated  Ask how much liquid to drink each day and which liquids are best for you  · Use a cool mist humidifier  to increase air moisture in your home  This may make it easier for you to breathe and help decrease your cough  · Get more rest   Rest helps your body to heal  Slowly start to do more each day  Rest when you feel it is needed  · Avoid irritants in the air  Avoid chemicals, fumes, and dust  Wear a face mask if you must work around dust or fumes  Stay inside on days when air pollution levels are high  If you have allergies, stay inside when pollen counts are high   Do not use aerosol products, such as spray-on deodorant, bug spray, and hair spray  · Do not smoke or be around others who are smoking  Nicotine and other chemicals in cigarettes and cigars can cause lung damage  Ask your healthcare provider for information if you currently smoke and need help to quit  E-cigarettes or smokeless tobacco still contain nicotine  Talk to your healthcare provider before you use these products  Prevent acute bronchitis:       1  Ask about vaccines you may need  Get a flu vaccine each year as soon as recommended, usually in September or October  Ask your healthcare provider if you should also get a pneumonia or COVID-19 vaccine  Your healthcare provider can tell you if you should also get other vaccines, and when to get them  2  Prevent the spread of germs  You can decrease your risk for acute bronchitis and other illnesses by doing the following:     ? Wash your hands often with soap and water  Carry germ-killing hand lotion or gel with you  You can use the lotion or gel to clean your hands when soap and water are not available  ? Do not touch your eyes, nose, or mouth unless you have washed your hands first     ? Always cover your mouth when you cough to prevent the spread of germs  It is best to cough into a tissue or your shirt sleeve instead of into your hand  Ask those around you to cover their mouths when they cough  ? Try to avoid people who have a cold or the flu  If you are sick, stay away from others as much as possible  Follow up with your doctor as directed:  Write down questions you have so you will remember to ask them during your follow-up visits  © Copyright [employer] 2022 Information is for End User's use only and may not be sold, redistributed or otherwise used for commercial purposes  All illustrations and images included in CareNotes® are the copyrighted property of [employer]  or Nena Becker   The above information is an  only   It is not intended as medical advice for individual conditions or treatments  Talk to your doctor, nurse or pharmacist before following any medical regimen to see if it is safe and effective for you

## 2023-01-17 NOTE — LETTER
[facility] VISIT VIR  [address] [address]    January 17, 2023     Patient: Filippo Alvarez   YOB: 1990   Date of Visit: 1/17/2023       To Whom it May Concern:    Tomas Boles is under my professional care  She was seen virtually on 1/17/2023  She may return to work on 1/18/23  If you have any questions or concerns, please don't hesitate to call           Sincerely,          Anay Dent PA-C        CC: No Recipients

## 2023-01-18 ENCOUNTER — OFFICE VISIT (OUTPATIENT)
Dept: FAMILY MEDICINE CLINIC | Facility: CLINIC | Age: 33
End: 2023-01-18

## 2023-01-18 VITALS
TEMPERATURE: 97.2 F | DIASTOLIC BLOOD PRESSURE: 82 MMHG | HEART RATE: 60 BPM | BODY MASS INDEX: 53.92 KG/M2 | SYSTOLIC BLOOD PRESSURE: 132 MMHG | HEIGHT: 62 IN | WEIGHT: 293 LBS

## 2023-01-18 DIAGNOSIS — E66.01 MORBID OBESITY (HCC): ICD-10-CM

## 2023-01-18 DIAGNOSIS — J01.00 ACUTE NON-RECURRENT MAXILLARY SINUSITIS: Primary | ICD-10-CM

## 2023-01-18 DIAGNOSIS — J45.41 MODERATE PERSISTENT ASTHMA WITH ACUTE EXACERBATION: ICD-10-CM

## 2023-01-18 RX ORDER — CEFUROXIME AXETIL 500 MG/1
500 TABLET ORAL EVERY 12 HOURS SCHEDULED
Qty: 20 TABLET | Refills: 0 | Status: SHIPPED | OUTPATIENT
Start: 2023-01-18 | End: 2023-01-28

## 2023-01-18 NOTE — LETTER
January 18, 2023     Patient: Jackolyn Dakin  YOB: 1990  Date of Visit: 1/18/2023      To Whom it May Concern:    Sherren Gens is under my professional care  Antione Fofana was seen in my office on 1/18/2023  Antione Fofana may return to work on Friday, 1/20/2023       If you have any questions or concerns, please don't hesitate to call           Sincerely,          Yfn Morales PA-C        CC: No Recipients

## 2023-01-18 NOTE — PROGRESS NOTES
Name: Rosendo Posada      : 1990      MRN: [id number]  Encounter Provider: Myles Campos PA-C  Encounter Date: 2023   Encounter department: [facility] PRIMARY CARE    Assessment & Plan     Patient Instructions   Assessment/plan:  1  Acute sinusitis-recommend starting Ceftin 500 mg twice daily for 10 days  Encourage plenty of fluids and rest with this  Patient will be given a work note to return on Friday  2   Asthma with acute exacerbation-patient does continue Symbicort inhaler twice daily as well as albuterol inhaler as needed  She does have prescription for prednisone which was given at a telemetry visit, but not yet started  Currently lungs do not have significant wheezing and I would recommend she wait a few days and if her sinus symptoms are not improving or she feels tighter in the chest she should then start the prednisone  3   Morbid obesity-continue healthy diet and exercise  Certainly this has the potential to exacerbate asthma symptoms  1  Acute non-recurrent maxillary sinusitis  -     cefuroxime (CEFTIN) 500 mg tablet; Take 1 tablet (500 mg total) by mouth every 12 (twelve) hours for 10 days    2  Moderate persistent asthma with acute exacerbation    3  Morbid obesity ([address] )         Subjective      HPI: This is a 81-year-old female that presents to the office with a history of asthma and obesity  She is concerned with sinus congestion, head pressure, postnasal drip, and cough which started about 5 days ago  It seems that the symptoms have been building and worsening  She has had 3 home COVID test which have been negative  She is using her asthma inhalers regularly with some relief  She has had more thick colored mucus from the nose however  She also feels that her cough is pretty deep within her chest   She has been feeling a bit more fatigued and rundown but she has not had any fevers present      Review of Systems   Constitutional: Positive for activity change and fatigue  Negative for fever  HENT: Positive for congestion, postnasal drip, rhinorrhea, sinus pressure and sore throat  Negative for ear pain  Respiratory: Positive for cough  Negative for chest tightness, shortness of breath and wheezing  Cardiovascular: Negative for palpitations  Gastrointestinal: Negative for diarrhea and nausea  Musculoskeletal: Positive for myalgias  Negative for arthralgias  Skin: Negative for rash  Neurological: Negative for dizziness and numbness  All other systems reviewed and are negative  Current Outpatient Medications on File Prior to Visit   Medication Sig   • ALPRAZolam (XANAX) 0 25 mg tablet 1/2 TO 2 TABS AS NEEDED FOR TRAVEL ANXIETY  • budesonide-formoterol (SYMBICORT) 80-4 5 MCG/ACT inhaler    • fluticasone (FLONASE) 50 mcg/act nasal spray 1 spray into each nostril daily   • norgestrel-ethinyl estradiol (Cryselle-28) 0 3 mg-30 mcg per tablet Take 1 tablet by mouth daily   • PROAIR  (90 Base) MCG/ACT inhaler    • sertraline (ZOLOFT) 100 mg tablet Take 1 tablet (100 mg total) by mouth daily   • sertraline (ZOLOFT) 50 mg tablet Take 1 tablet (50 mg total) by mouth daily TAKE DAILY WITH 100 MG ZOLOFT TO EQUAL 150 MG DAILY  • predniSONE 20 mg tablet Take 2 5 tablets (50 mg total) by mouth daily for 2 days, THEN 2 tablets (40 mg total) daily for 2 days, THEN 1 5 tablets (30 mg total) daily for 2 days, THEN 1 tablet (20 mg total) daily for 1 day  (Patient not taking: Reported on 1/18/2023)       Objective     /82   Pulse 60   Temp (!) 97 2 °F (36 2 °C)   Ht 5' 2" (1 575 m)   Wt (!) 150 kg (331 lb 9 6 oz)   BMI 60 65 kg/m²     Physical Exam  Vitals and nursing note reviewed  Constitutional:       General: She is not in acute distress  Appearance: She is well-developed  HENT:      Head: Normocephalic and atraumatic  Mouth/Throat:      Pharynx: No oropharyngeal exudate  Eyes:      General:         Right eye: No discharge  Left eye: No discharge  Pupils: Pupils are equal, round, and reactive to light  Cardiovascular:      Rate and Rhythm: Normal rate and regular rhythm  Heart sounds: Normal heart sounds  No murmur heard  No friction rub  Pulmonary:      Effort: Pulmonary effort is normal  No respiratory distress  Breath sounds: Normal breath sounds  No wheezing or rales  Musculoskeletal:         General: Normal range of motion  Cervical back: Neck supple  Lymphadenopathy:      Cervical: Cervical adenopathy present  Skin:     General: Skin is warm and dry  Findings: No erythema  Neurological:      Mental Status: She is alert and oriented to person, place, and time     Psychiatric:         Behavior: Behavior normal        Irene Reyes PA-C

## 2023-01-18 NOTE — PATIENT INSTRUCTIONS
Assessment/plan:  1  Acute sinusitis-recommend starting Ceftin 500 mg twice daily for 10 days  Encourage plenty of fluids and rest with this  Patient will be given a work note to return on Friday  2   Asthma with acute exacerbation-patient does continue Symbicort inhaler twice daily as well as albuterol inhaler as needed  She does have prescription for prednisone which was given at a telemetry visit, but not yet started  Currently lungs do not have significant wheezing and I would recommend she wait a few days and if her sinus symptoms are not improving or she feels tighter in the chest she should then start the prednisone  3   Morbid obesity-continue healthy diet and exercise  Certainly this has the potential to exacerbate asthma symptoms

## 2023-01-30 ENCOUNTER — OFFICE VISIT (OUTPATIENT)
Dept: FAMILY MEDICINE CLINIC | Facility: CLINIC | Age: 33
End: 2023-01-30

## 2023-01-30 VITALS
HEIGHT: 62 IN | BODY MASS INDEX: 53.92 KG/M2 | SYSTOLIC BLOOD PRESSURE: 130 MMHG | WEIGHT: 293 LBS | DIASTOLIC BLOOD PRESSURE: 80 MMHG | HEART RATE: 100 BPM

## 2023-01-30 DIAGNOSIS — J45.41 MODERATE PERSISTENT ASTHMA WITH ACUTE EXACERBATION: ICD-10-CM

## 2023-01-30 DIAGNOSIS — E66.01 MORBID OBESITY (HCC): ICD-10-CM

## 2023-01-30 DIAGNOSIS — F41.9 ANXIETY: Primary | ICD-10-CM

## 2023-01-30 DIAGNOSIS — F51.01 PRIMARY INSOMNIA: ICD-10-CM

## 2023-01-30 DIAGNOSIS — E78.2 MIXED HYPERLIPIDEMIA: ICD-10-CM

## 2023-01-30 PROBLEM — J30.1 NON-SEASONAL ALLERGIC RHINITIS DUE TO POLLEN: Status: RESOLVED | Noted: 2019-03-05 | Resolved: 2023-01-30

## 2023-01-30 RX ORDER — SERTRALINE HYDROCHLORIDE 100 MG/1
100 TABLET, FILM COATED ORAL DAILY
Qty: 90 TABLET | Refills: 3 | Status: SHIPPED | OUTPATIENT
Start: 2023-01-30

## 2023-01-30 NOTE — PATIENT INSTRUCTIONS
1  Anxiety  Assessment & Plan:  PT is still on zoloft 150 mg and going through some stress with her Moms medical issues  NO SI or HI  Orders:  -     sertraline (ZOLOFT) 50 mg tablet; Take 1 tablet (50 mg total) by mouth daily TAKE DAILY WITH 100 MG ZOLOFT TO EQUAL 150 MG DAILY  -     sertraline (ZOLOFT) 100 mg tablet; Take 1 tablet (100 mg total) by mouth daily    2  Morbid obesity ([facility] [address] )  Assessment & Plan:  BMI is now 64 patient highly encouraged to decrease caloric intake and increase activity over time to routine weight loss  3  Moderate persistent asthma with acute exacerbation  Assessment & Plan:  Improved from recent cold  4  Mixed hyperlipidemia  Assessment & Plan:  Check fasting lipid panel and call with results  5  Primary insomnia  Assessment & Plan:  May try generic benadryl 25-50 mg at bed as needed  1  Anxiety  Assessment & Plan:  PT is still on zoloft 150 mg and going through some stress with her Moms medical issues  NO SI or HI  Orders:  -     sertraline (ZOLOFT) 50 mg tablet; Take 1 tablet (50 mg total) by mouth daily TAKE DAILY WITH 100 MG ZOLOFT TO EQUAL 150 MG DAILY  -     sertraline (ZOLOFT) 100 mg tablet; Take 1 tablet (100 mg total) by mouth daily    2  Morbid obesity ([facility] [address] )  Assessment & Plan:  BMI is now 64 patient highly encouraged to decrease caloric intake and increase activity over time to routine weight loss  3  Moderate persistent asthma with acute exacerbation  Assessment & Plan:  Improved from recent cold  4  Mixed hyperlipidemia  Assessment & Plan:  Check fasting lipid panel and call with results  5  Primary insomnia  Assessment & Plan:  May try generic benadryl 25-50 mg at bed as needed

## 2023-01-30 NOTE — ASSESSMENT & PLAN NOTE
BMI is now 64 patient highly encouraged to decrease caloric intake and increase activity over time to routine weight loss 
Check fasting lipid panel and call with results 
Improved from recent cold 
May try generic benadryl 25-50 mg at bed as needed 
PT is still on zoloft 150 mg and going through some stress with her Moms medical issues  NO SI or HI 
Female

## 2023-01-30 NOTE — PROGRESS NOTES
Name: Alexandra Pretty      : 1990      MRN: [id number]  Encounter Provider: Thor Bamberger, PA-C  Encounter Date: 2023   Encounter department: [facility]    Assessment & Plan     1  Anxiety  Assessment & Plan:  PT is still on zoloft 150 mg and going through some stress with her Moms medical issues  NO SI or HI  Orders:  -     sertraline (ZOLOFT) 50 mg tablet; Take 1 tablet (50 mg total) by mouth daily TAKE DAILY WITH 100 MG ZOLOFT TO EQUAL 150 MG DAILY  -     sertraline (ZOLOFT) 100 mg tablet; Take 1 tablet (100 mg total) by mouth daily    2  Morbid obesity ([address] )  Assessment & Plan:  BMI is now 64 patient highly encouraged to decrease caloric intake and increase activity over time to routine weight loss  3  Moderate persistent asthma with acute exacerbation  Assessment & Plan:  Improved from recent cold  4  Mixed hyperlipidemia  Assessment & Plan:  Check fasting lipid panel and call with results  5  Primary insomnia  Assessment & Plan:  May try generic benadryl 25-50 mg at bed as needed  BMI Counseling: Body mass index is 61 27 kg/m²  The BMI is above normal  Nutrition recommendations include decreasing portion sizes, encouraging healthy choices of fruits and vegetables, decreasing fast food intake, consuming healthier snacks, limiting drinks that contain sugar, moderation in carbohydrate intake, increasing intake of lean protein, reducing intake of saturated and trans fat and reducing intake of cholesterol  Exercise recommendations include exercising 3-5 times per week  No pharmacotherapy was ordered  Rationale for BMI follow-up plan is due to patient being overweight or obese  Subjective        Alexandra Pretty is here for chronic conditions f/u  She has been under more stress than usual as she lives with her mother and her mother's been having medical problems and she is the only child  Her father is not in the picture    No SI or HI she has been taking her Zoloft 150 mg and believes that she does not need any adjustments to this at this time  She was recently sick and was given antibiotics and had an asthma exacerbation  She states that this is much better and she no longer needs her inhaler  Here for refills today  Review of Systems   Constitutional: Negative  HENT: Negative  Eyes: Negative  Respiratory: Negative  Cardiovascular: Negative  Gastrointestinal: Negative  Endocrine: Negative  Genitourinary: Negative  Musculoskeletal: Negative  Skin: Negative  Allergic/Immunologic: Negative  Neurological: Negative  Hematological: Negative  Psychiatric/Behavioral: Negative  Current Outpatient Medications on File Prior to Visit   Medication Sig   • ALPRAZolam (XANAX) 0 25 mg tablet 1/2 TO 2 TABS AS NEEDED FOR TRAVEL ANXIETY  • budesonide-formoterol (SYMBICORT) 80-4 5 MCG/ACT inhaler    • fluticasone (FLONASE) 50 mcg/act nasal spray 1 spray into each nostril daily   • norgestrel-ethinyl estradiol (Cryselle-28) 0 3 mg-30 mcg per tablet Take 1 tablet by mouth daily   • PROAIR  (90 Base) MCG/ACT inhaler    • [DISCONTINUED] sertraline (ZOLOFT) 100 mg tablet Take 1 tablet (100 mg total) by mouth daily   • [DISCONTINUED] sertraline (ZOLOFT) 50 mg tablet Take 1 tablet (50 mg total) by mouth daily TAKE DAILY WITH 100 MG ZOLOFT TO EQUAL 150 MG DAILY  Objective     /80   Pulse 100   Ht 5' 2" (1 575 m)   Wt (!) 152 kg (335 lb)   BMI 61 27 kg/m²     Physical Exam  Vitals and nursing note reviewed  Constitutional:       General: She is not in acute distress  Appearance: She is well-developed  She is not diaphoretic  HENT:      Head: Normocephalic and atraumatic  Eyes:      General:         Right eye: No discharge  Left eye: No discharge  Conjunctiva/sclera: Conjunctivae normal    Neck:      Vascular: No carotid bruit     Cardiovascular:      Rate and Rhythm: Normal rate and regular rhythm  Heart sounds: Normal heart sounds  No murmur heard  No friction rub  No gallop  Pulmonary:      Effort: Pulmonary effort is normal  No respiratory distress  Breath sounds: Normal breath sounds  No wheezing or rales  Musculoskeletal:      Cervical back: Neck supple  Skin:     General: Skin is warm and dry  Neurological:      Mental Status: She is alert and oriented to person, place, and time     Psychiatric:         Judgment: Judgment normal        True YELENA Talavera

## 2023-02-20 LAB
ALBUMIN SERPL-MCNC: 3.7 G/DL (ref 3.6–5.1)
ALBUMIN/GLOB SERPL: 1.3 (CALC) (ref 1–2.5)
ALP SERPL-CCNC: 92 U/L (ref 31–125)
ALT SERPL-CCNC: 10 U/L (ref 6–29)
AST SERPL-CCNC: 10 U/L (ref 10–30)
BILIRUB SERPL-MCNC: 0.3 MG/DL (ref 0.2–1.2)
BUN SERPL-MCNC: 13 MG/DL (ref 7–25)
BUN/CREAT SERPL: NORMAL (CALC) (ref 6–22)
CALCIUM SERPL-MCNC: 8.6 MG/DL (ref 8.6–10.2)
CHLORIDE SERPL-SCNC: 105 MMOL/L (ref 98–110)
CHOLEST SERPL-MCNC: 169 MG/DL
CHOLEST/HDLC SERPL: 3.6 (CALC)
CO2 SERPL-SCNC: 28 MMOL/L (ref 20–32)
CREAT SERPL-MCNC: 0.59 MG/DL (ref 0.5–0.97)
GFR/BSA.PRED SERPLBLD CYS-BASED-ARV: 123 ML/MIN/1.73M2
GLOBULIN SER CALC-MCNC: 2.9 G/DL (CALC) (ref 1.9–3.7)
GLUCOSE SERPL-MCNC: 87 MG/DL (ref 65–99)
HDLC SERPL-MCNC: 47 MG/DL
LDLC SERPL CALC-MCNC: 100 MG/DL (CALC)
NONHDLC SERPL-MCNC: 122 MG/DL (CALC)
POTASSIUM SERPL-SCNC: 4.3 MMOL/L (ref 3.5–5.3)
PROT SERPL-MCNC: 6.6 G/DL (ref 6.1–8.1)
SODIUM SERPL-SCNC: 140 MMOL/L (ref 135–146)
TRIGL SERPL-MCNC: 122 MG/DL

## 2023-02-21 NOTE — RESULT ENCOUNTER NOTE
Please let pt know her labs look good  Good HDL cholesterol slightly low and the only way to bring this up is to increase activity  Check yearly

## 2023-03-05 ENCOUNTER — TELEPHONE (OUTPATIENT)
Dept: OTHER | Facility: OTHER | Age: 33
End: 2023-03-05

## 2023-03-05 NOTE — TELEPHONE ENCOUNTER
Patient would like a call back from the office to schedule a sick visit,  She has coughing, upper respiratory congestion, and chills  Patient declined speaking with a triage nurse at this time

## 2023-03-06 ENCOUNTER — TELEMEDICINE (OUTPATIENT)
Dept: FAMILY MEDICINE CLINIC | Facility: CLINIC | Age: 33
End: 2023-03-06

## 2023-03-06 VITALS — WEIGHT: 293 LBS | BODY MASS INDEX: 61.27 KG/M2 | TEMPERATURE: 97.6 F

## 2023-03-06 DIAGNOSIS — B34.9 VIRAL INFECTION, UNSPECIFIED: Primary | ICD-10-CM

## 2023-03-06 NOTE — PATIENT INSTRUCTIONS
1  Viral infection, unspecified  Assessment & Plan:  PT with home covid test negative but high exposure at work and symptomatic  Will have her come to office for swab flu/covid in our parking lot and follow the directions below until we know the results tomorrow  Note for work given  Pt  is advised to take deep breathes often, increase fluids, avoid sleeping or resting on their back and pick a side or stomach instead, add on Vit  C, D and zinc, use any OTC meds they normally would use for a cold/fever and let us know if instead of gradually getting better over the next week they develop any worsening symptoms shortness of breathe or chest pain        Orders:  -     Covid/Flu- Office Collect

## 2023-03-06 NOTE — PROGRESS NOTES
COVID-19 Outpatient Progress Note    Assessment/Plan:    Problem List Items Addressed This Visit        Other    Viral infection, unspecified - Primary     PT with home covid test negative but high exposure at work and symptomatic  Will have her come to office for swab flu/covid in our parking lot and follow the directions below until we know the results tomorrow  Note for work given  Pt  is advised to take deep breathes often, increase fluids, avoid sleeping or resting on their back and pick a side or stomach instead, add on Vit  C, D and zinc, use any OTC meds they normally would use for a cold/fever and let us know if instead of gradually getting better over the next week they develop any worsening symptoms shortness of breathe or chest pain  Relevant Orders    Covid/Flu- Office Collect      Disposition:     Patient has asymptomatic or mild COVID-19 infection  Based off CDC guidelines, they were recommended to isolate for 5 days  If they are asymptomatic or symptoms are improving with no fevers in the past 24 hours, isolation may be ended followed by 5 days of wearing a mask when around othes to minimize risk of infecting others  If still have a fever or other symptoms have not improved, continue to isolate until they improve  Regardless of when they end isolation, avoid being around people who are more likely to get very sick from COVID-19 until at least day 11  I have spent a total time of 15 minutes on the day of the encounter for this patient including discussing prognosis, risks and benefits of treatment options, instructions for management, impressions, documenting in the medical record, reviewing/ordering tests, medicine, procedures and obtaining or reviewing history         Encounter provider: Jaquelin Ann PA-C     Provider located at: [address] [address] [phone]     Recent Visits  No visits were found meeting these conditions  Showing recent visits within past 7 days and meeting all other requirements  Today's Visits  Date Type Provider Dept   03/06/23 1135 Brennen Stark PA-C Pg [facility]   Showing today's visits and meeting all other requirements  Future Appointments  No visits were found meeting these conditions  Showing future appointments within next 150 days and meeting all other requirements     This virtual check-in was done via [address] and patient was informed that this is a secure, HIPAA-compliant platform  She agrees to proceed  Patient agrees to participate in a virtual check in via telephone or video visit instead of presenting to the office to address urgent/immediate medical needs  Patient is aware this is a billable service  She acknowledged consent and understanding of privacy and security of the video platform  The patient has agreed to participate and understands they can discontinue the visit at any time  After connecting through [facility], the patient was identified by name and date of birth  Seng Ott was informed that this was a telemedicine visit and that the exam was being conducted confidentially over secure lines  Seng Ott acknowledged consent and understanding of privacy and security of the telemedicine visit  I informed the patient that I have reviewed her record in Epic and presented the opportunity for her to ask any questions regarding the visit today  The patient agreed to participate  Verification of patient location:  Patient is located in the following state in which I hold an active license: [place]    Subjective:   Seng Ott is a 33 y o  female who has been screened for COVID-19  Symptom change since last report: unchanged  Patient's symptoms include chills, fatigue, malaise, nasal congestion, rhinorrhea, sore throat, cough, myalgias and headache   Patient denies fever, anosmia, loss of taste, shortness of breath, chest tightness, abdominal pain, nausea, vomiting and diarrhea  - Date of symptom onset: 3/2/2023      COVID-19 vaccination status: Fully vaccinated with booster    Andreas Mantilla has been staying home and has isolated themselves in her home  She is taking care to not share personal items and is cleaning all surfaces that are touched often, like counters, tabletops, and doorknobs using household cleaning sprays or wipes  She is wearing a mask when she leaves her room  Migraine last Wed Friday with congestion and progression of other symptoms  O2 has been 98  Home test was negative  More than one coworker has covid currently  she works  Lab Results   Component Value Date    SARSCOV2 Negative 05/24/2022       Review of Systems   Constitutional: Positive for chills and fatigue  Negative for fever  HENT: Positive for congestion, rhinorrhea and sore throat  Respiratory: Positive for cough  Negative for chest tightness and shortness of breath  Gastrointestinal: Negative for abdominal pain, diarrhea, nausea and vomiting  Musculoskeletal: Positive for myalgias  Neurological: Positive for headaches  Current Outpatient Medications on File Prior to Visit   Medication Sig   • ALPRAZolam (XANAX) 0 25 mg tablet 1/2 TO 2 TABS AS NEEDED FOR TRAVEL ANXIETY  • budesonide-formoterol (SYMBICORT) 80-4 5 MCG/ACT inhaler    • fluticasone (FLONASE) 50 mcg/act nasal spray 1 spray into each nostril daily   • norgestrel-ethinyl estradiol (Cryselle-28) 0 3 mg-30 mcg per tablet Take 1 tablet by mouth daily   • PROAIR  (90 Base) MCG/ACT inhaler    • sertraline (ZOLOFT) 100 mg tablet Take 1 tablet (100 mg total) by mouth daily   • sertraline (ZOLOFT) 50 mg tablet Take 1 tablet (50 mg total) by mouth daily TAKE DAILY WITH 100 MG ZOLOFT TO EQUAL 150 MG DAILY  Objective:    Temp 97 6 °F (36 4 °C) (Oral)   Wt (!) 152 kg (335 lb) Comment: per pt  BMI 61 27 kg/m²      Physical Exam  Vitals and nursing note reviewed  Constitutional:       General: She is not in acute distress  Appearance: Normal appearance  HENT:      Head: Normocephalic and atraumatic  Eyes:      General:         Right eye: No discharge  Left eye: No discharge  Conjunctiva/sclera: Conjunctivae normal    Pulmonary:      Effort: Pulmonary effort is normal    Skin:     General: Skin is warm and dry  Neurological:      General: No focal deficit present  Mental Status: She is alert  Psychiatric:         Mood and Affect: Mood normal          Behavior: Behavior normal          Thought Content:  Thought content normal          Judgment: Judgment normal        Jaquelin Ann PA-C

## 2023-03-06 NOTE — ASSESSMENT & PLAN NOTE
PT with home covid test negative but high exposure at work and symptomatic  Will have her come to office for swab flu/covid in our parking lot and follow the directions below until we know the results tomorrow  Note for work given  Pt  is advised to take deep breathes often, increase fluids, avoid sleeping or resting on their back and pick a side or stomach instead, add on Vit  C, D and zinc, use any OTC meds they normally would use for a cold/fever and let us know if instead of gradually getting better over the next week they develop any worsening symptoms shortness of breathe or chest pain

## 2023-03-06 NOTE — LETTER
March 6, 2023     Patient: Carlos Schofield   YOB: 1990   Date of Visit: 3/6/2023       To Whom It May Concern: It is my medical opinion that Toro Peralta was seen for an appointment today and flu/covid test is currently pending  NO work until further notice       If you have any questions or concerns, please don't hesitate to call           Sincerely,        Nancie Anand PA-C    CC: No Recipients

## 2023-03-07 ENCOUNTER — TELEMEDICINE (OUTPATIENT)
Dept: FAMILY MEDICINE CLINIC | Facility: CLINIC | Age: 33
End: 2023-03-07

## 2023-03-07 DIAGNOSIS — U07.1 COVID-19 VIRUS INFECTION: Primary | ICD-10-CM

## 2023-03-07 DIAGNOSIS — J45.41 MODERATE PERSISTENT ASTHMA WITH ACUTE EXACERBATION: ICD-10-CM

## 2023-03-07 LAB
FLUAV RNA RESP QL NAA+PROBE: NEGATIVE
FLUBV RNA RESP QL NAA+PROBE: NEGATIVE
SARS-COV-2 RNA RESP QL NAA+PROBE: POSITIVE

## 2023-03-07 RX ORDER — AZITHROMYCIN 250 MG/1
TABLET, FILM COATED ORAL
Qty: 6 TABLET | Refills: 0 | Status: SHIPPED | OUTPATIENT
Start: 2023-03-07 | End: 2023-03-11

## 2023-03-07 RX ORDER — PREDNISONE 10 MG/1
TABLET ORAL
Qty: 25 TABLET | Refills: 0 | Status: SHIPPED | OUTPATIENT
Start: 2023-03-07

## 2023-03-07 NOTE — PROGRESS NOTES
COVID-19 Outpatient Progress Note    Assessment/Plan:    Problem List Items Addressed This Visit        Respiratory    Asthma     Continue symbicort and albuterol as directed  Other    COVID-19 virus infection - Primary     Day 7  Pt positive PCR from yesterday  Slowly getting better but all mucus is now yellow  Pt  Should stay on her OTC cold meds  Zpack and prednisone taper called in to cover secondary bacterial infection  Pt is to continue her BID symbicort and albuterol 3-4 times a day  Note written to go back to work Friday, sooner if able with mask  Pt  is advised to take deep breathes often, increase fluids, avoid sleeping or resting on their back and pick a side or stomach instead, add on Vit  C, D and zinc, use any OTC meds they normally would use for a cold/fever and let us know if instead of gradually getting better over the next week they develop any worsening symptoms shortness of breathe or chest pain  Relevant Medications    azithromycin (ZITHROMAX) 250 mg tablet    predniSONE 10 mg tablet      Disposition:     Patient has asymptomatic or mild COVID-19 infection  Based off CDC guidelines, they were recommended to isolate for 5 days  If they are asymptomatic or symptoms are improving with no fevers in the past 24 hours, isolation may be ended followed by 5 days of wearing a mask when around othes to minimize risk of infecting others  If still have a fever or other symptoms have not improved, continue to isolate until they improve  Regardless of when they end isolation, avoid being around people who are more likely to get very sick from COVID-19 until at least day 11  Discussed symptom directed medication options with patient  Discussed vitamin D, vitamin C, and/or zinc supplementation with patient       I have spent a total time of 15 minutes on the day of the encounter for this patient including       Encounter provider: Gaby Lockwood PA-C     Provider located at: [facility]  [facility]  [address] [place] [id number] [phone]     Recent Visits  Date Type Provider Dept   03/06/23 Telemedicine Sanjeev Castaneda PA-C Pg [facility]   Showing recent visits within past 7 days and meeting all other requirements  Today's Visits  Date Type Provider Dept   03/07/23 1135 Brennen Stark PA-C Pg [facility]   Showing today's visits and meeting all other requirements  Future Appointments  No visits were found meeting these conditions  Showing future appointments within next 150 days and meeting all other requirements     This virtual check-in was done via [address] and patient was informed that this is a secure, HIPAA-compliant platform  She agrees to proceed  Patient agrees to participate in a virtual check in via telephone or video visit instead of presenting to the office to address urgent/immediate medical needs  Patient is aware this is a billable service  She acknowledged consent and understanding of privacy and security of the video platform  The patient has agreed to participate and understands they can discontinue the visit at any time  After connecting through [facility], the patient was identified by name and date of birth  Bryan Acuna was informed that this was a telemedicine visit and that the exam was being conducted confidentially over secure lines  My office door was closed  No one else was in the room  Bryan Acuna acknowledged consent and understanding of privacy and security of the telemedicine visit  I informed the patient that I have reviewed her record in Epic and presented the opportunity for her to ask any questions regarding the visit today  The patient agreed to participate      Verification of patient location:  Patient is located in the following state in which I hold an active license: [place]    Subjective:   Bryan Acuna is a 33 y o  female who has been screened for COVID-19  Symptom change since last report: improving  Patient's symptoms include nasal congestion, rhinorrhea, cough and shortness of breath  Patient denies fever, chills, fatigue, malaise, sore throat, anosmia, loss of taste, chest tightness, abdominal pain, nausea, vomiting, diarrhea, myalgias and headaches  - Date of symptom onset: 3/2/2023  - Date of positive COVID-19 test: 3/6/2023  Type of test: PCR  COVID-19 vaccination status: Fully vaccinated with booster    Andreas Mantilla has been staying home and has isolated themselves in her home  She is taking care to not share personal items and is cleaning all surfaces that are touched often, like counters, tabletops, and doorknobs using household cleaning sprays or wipes  She is wearing a mask when she leaves her room  O2 98 no fever Sudafed PE  Flonase, nasal mist  Mucus is yellow  Lab Results   Component Value Date    SARSCOV2 Positive (A) 03/06/2023       Review of Systems   Constitutional: Negative  Negative for chills, fatigue and fever  HENT: Positive for congestion and rhinorrhea  Negative for sore throat  Eyes: Negative  Respiratory: Positive for cough and shortness of breath  Negative for chest tightness  Cardiovascular: Negative  Gastrointestinal: Negative  Negative for abdominal pain, diarrhea, nausea and vomiting  Endocrine: Negative  Genitourinary: Negative  Musculoskeletal: Negative  Negative for myalgias  Skin: Negative  Allergic/Immunologic: Negative  Neurological: Negative  Negative for headaches  Hematological: Negative  Psychiatric/Behavioral: Negative  Current Outpatient Medications on File Prior to Visit   Medication Sig   • ALPRAZolam (XANAX) 0 25 mg tablet 1/2 TO 2 TABS AS NEEDED FOR TRAVEL ANXIETY     • budesonide-formoterol (SYMBICORT) 80-4 5 MCG/ACT inhaler    • fluticasone (FLONASE) 50 mcg/act nasal spray 1 spray into each nostril daily   • norgestrel-ethinyl estradiol (Cryselle-28) 0 3 mg-30 mcg per tablet Take 1 tablet by mouth daily   • PROAIR  (90 Base) MCG/ACT inhaler    • sertraline (ZOLOFT) 100 mg tablet Take 1 tablet (100 mg total) by mouth daily   • sertraline (ZOLOFT) 50 mg tablet Take 1 tablet (50 mg total) by mouth daily TAKE DAILY WITH 100 MG ZOLOFT TO EQUAL 150 MG DAILY  Objective: There were no vitals taken for this visit  Physical Exam  Vitals and nursing note reviewed  Constitutional:       General: She is not in acute distress  Appearance: Normal appearance  HENT:      Head: Normocephalic and atraumatic  Eyes:      General:         Right eye: No discharge  Left eye: No discharge  Conjunctiva/sclera: Conjunctivae normal    Pulmonary:      Effort: Pulmonary effort is normal    Skin:     General: Skin is warm and dry  Neurological:      General: No focal deficit present  Mental Status: She is alert  Psychiatric:         Mood and Affect: Mood normal          Behavior: Behavior normal          Thought Content:  Thought content normal          Judgment: Judgment normal        Victoria Morse PA-C

## 2023-03-07 NOTE — PATIENT INSTRUCTIONS
1  COVID-19 virus infection  Assessment & Plan:  Day 7  Pt positive PCR from yesterday  Slowly getting better but all mucus is now yellow  Pt  Should stay on her OTC cold meds  Zpack and prednisone taper called in to cover secondary bacterial infection  Pt is to continue her BID symbicort and albuterol 3-4 times a day  Note written to go back to work Friday, sooner if able with mask  Pt  is advised to take deep breathes often, increase fluids, avoid sleeping or resting on their back and pick a side or stomach instead, add on Vit  C, D and zinc, use any OTC meds they normally would use for a cold/fever and let us know if instead of gradually getting better over the next week they develop any worsening symptoms shortness of breathe or chest pain  Orders:  -     azithromycin (ZITHROMAX) 250 mg tablet; Take two tablets on day one and then one tablet daily for the next four days  -     predniSONE 10 mg tablet; Day 1= 50 mg at once, date 2+3= 40 mg once daily, day 4+5= 30 mg once daily, day 6+7= 20 mg once daily, and day 8+9= 10 mg once daily  2  Moderate persistent asthma with acute exacerbation  Assessment & Plan:  Continue symbicort and albuterol as directed

## 2023-03-07 NOTE — ASSESSMENT & PLAN NOTE
Day 7  Pt positive PCR from yesterday  Slowly getting better but all mucus is now yellow  Pt  Should stay on her OTC cold meds  Zpack and prednisone taper called in to cover secondary bacterial infection  Pt is to continue her BID symbicort and albuterol 3-4 times a day  Note written to go back to work Friday, sooner if able with mask  Pt  is advised to take deep breathes often, increase fluids, avoid sleeping or resting on their back and pick a side or stomach instead, add on Vit  C, D and zinc, use any OTC meds they normally would use for a cold/fever and let us know if instead of gradually getting better over the next week they develop any worsening symptoms shortness of breathe or chest pain

## 2023-03-07 NOTE — LETTER
March 7, 2023     Patient: iGuliana Fraser   YOB: 1990   Date of Visit: 3/7/2023       To Whom It May Concern: It is my medical opinion that Gerald Gallardo may return to work on 3/10/23, sooner if able       If you have any questions or concerns, please don't hesitate to call           Sincerely,        Hannah Frey PA-C    CC: No Recipients

## 2023-03-20 DIAGNOSIS — N91.4 SECONDARY OLIGOMENORRHEA: ICD-10-CM

## 2023-03-21 RX ORDER — NORGESTREL-ETHINYL ESTRADIOL 0.3-0.03MG
TABLET ORAL
Qty: 84 TABLET | Refills: 2 | Status: SHIPPED | OUTPATIENT
Start: 2023-03-21 | End: 2023-03-27 | Stop reason: SDUPTHER

## 2023-03-27 ENCOUNTER — ANNUAL EXAM (OUTPATIENT)
Dept: OBGYN CLINIC | Facility: MEDICAL CENTER | Age: 33
End: 2023-03-27

## 2023-03-27 VITALS
BODY MASS INDEX: 53.92 KG/M2 | DIASTOLIC BLOOD PRESSURE: 74 MMHG | SYSTOLIC BLOOD PRESSURE: 110 MMHG | WEIGHT: 293 LBS | HEIGHT: 62 IN

## 2023-03-27 DIAGNOSIS — J01.00 ACUTE NON-RECURRENT MAXILLARY SINUSITIS: ICD-10-CM

## 2023-03-27 DIAGNOSIS — Z01.419 ENCOUNTER FOR WELL WOMAN EXAM WITH ROUTINE GYNECOLOGICAL EXAM: Primary | ICD-10-CM

## 2023-03-27 DIAGNOSIS — N91.4 SECONDARY OLIGOMENORRHEA: ICD-10-CM

## 2023-03-27 RX ORDER — NORGESTREL-ETHINYL ESTRADIOL 0.3-0.03MG
1 TABLET ORAL DAILY
Qty: 84 TABLET | Refills: 3 | Status: SHIPPED | OUTPATIENT
Start: 2023-03-27

## 2023-03-27 NOTE — PROGRESS NOTES
"[facility] [address] [place] [address]    ASSESSMENT/PLAN: Kemar Bello is a 33 y o  Katerine Sisfabienne who presents for annual gynecologic exam     Encounter for routine gynecologic examination  - Routine well woman exam completed today  - Cervical Cancer Screening: Current ASCCP Guidelines reviewed  Last Pap: 03/21/2022   Next Pap Due: 2025  - Contraceptive counseling discussed  Current contraception: combination OCPs     Additional problems addressed during this visit:  1  Encounter for well woman exam with routine gynecological exam    2  Secondary oligomenorrhea        CC:  Annual Gynecologic Examination    HPI: Kemar Bello is a 33 y o  Katerine Efren who presents for annual gynecologic examination  HPI  She reports no new changes to her health  She reports no breast concerns  She gets regular periods  She has no vaginal discharge, vulvar or vaginal lesions, pelvic pain, or abnormal bleeding  She has no sexual health concerns and is currently sexually active with one male partner  She contracepts with OCPs  The following portions of the patient's history were reviewed and updated as appropriate: allergies, current medications, past family history, past medical history, obstetric history, gynecologic history, past social history, past surgical history and problem list     Review of Systems   Constitutional: Negative  HENT: Negative  Eyes: Negative  Respiratory: Negative  Cardiovascular: Negative  Gastrointestinal: Negative  Genitourinary: Negative  Musculoskeletal: Negative  All other systems reviewed and are negative  Objective:  /74   Ht 5' 2\" (1 575 m)   Wt (!) 151 kg (332 lb)   LMP 03/03/2023   BMI 60 72 kg/m²    Physical Exam  Vitals reviewed  Constitutional:       General: She is not in acute distress  Appearance: She is well-developed  HENT:      Head: Normocephalic and atraumatic        Nose: Nose normal  " Cardiovascular:      Rate and Rhythm: Normal rate  Pulmonary:      Effort: Pulmonary effort is normal  No respiratory distress  Chest:   Breasts:     Breasts are symmetrical       Right: Normal  No mass, nipple discharge, skin change or tenderness  Left: Normal  No mass, nipple discharge, skin change or tenderness  Abdominal:      General: There is no distension  Palpations: Abdomen is soft  There is no mass  Tenderness: There is no abdominal tenderness  There is no guarding or rebound  Genitourinary:     General: Normal vulva  Exam position: Lithotomy position  Labia:         Right: No lesion  Left: No lesion  Urethra: No prolapse (urethral meatus normal)  Vagina: Normal  No vaginal discharge, erythema or bleeding  Cervix: Normal       Uterus: Normal        Adnexa: Right adnexa normal and left adnexa normal    Musculoskeletal:         General: Normal range of motion  Cervical back: Normal range of motion  Lymphadenopathy:      Upper Body:      Right upper body: No supraclavicular, axillary or pectoral adenopathy  Left upper body: No supraclavicular, axillary or pectoral adenopathy  Lower Body: No right inguinal adenopathy  No left inguinal adenopathy  Skin:     General: Skin is warm and dry  Neurological:      Mental Status: She is alert and oriented to person, place, and time  Psychiatric:         Behavior: Behavior normal          Thought Content:  Thought content normal          Judgment: Judgment normal

## 2023-03-28 RX ORDER — FLUTICASONE PROPIONATE 50 MCG
1 SPRAY, SUSPENSION (ML) NASAL DAILY
Qty: 11.1 ML | Refills: 0 | Status: SHIPPED | OUTPATIENT
Start: 2023-03-28

## 2023-05-01 ENCOUNTER — OFFICE VISIT (OUTPATIENT)
Dept: FAMILY MEDICINE CLINIC | Facility: CLINIC | Age: 33
End: 2023-05-01

## 2023-05-01 VITALS
WEIGHT: 293 LBS | HEART RATE: 88 BPM | OXYGEN SATURATION: 94 % | BODY MASS INDEX: 53.92 KG/M2 | SYSTOLIC BLOOD PRESSURE: 130 MMHG | HEIGHT: 62 IN | DIASTOLIC BLOOD PRESSURE: 82 MMHG | TEMPERATURE: 96.8 F

## 2023-05-01 DIAGNOSIS — J45.41 MODERATE PERSISTENT ASTHMA WITH ACUTE EXACERBATION: Primary | ICD-10-CM

## 2023-05-01 DIAGNOSIS — J06.9 ACUTE URI: ICD-10-CM

## 2023-05-01 PROBLEM — Z86.16 HISTORY OF COVID-19: Status: ACTIVE | Noted: 2022-05-24

## 2023-05-01 RX ORDER — AZITHROMYCIN 250 MG/1
TABLET, FILM COATED ORAL
Qty: 6 TABLET | Refills: 0 | Status: SHIPPED | OUTPATIENT
Start: 2023-05-01 | End: 2023-05-05

## 2023-05-01 RX ORDER — IPRATROPIUM BROMIDE AND ALBUTEROL SULFATE 2.5; .5 MG/3ML; MG/3ML
3 SOLUTION RESPIRATORY (INHALATION) 4 TIMES DAILY
Qty: 25 ML | Refills: 1 | Status: SHIPPED | OUTPATIENT
Start: 2023-05-01

## 2023-05-01 RX ORDER — PREDNISONE 10 MG/1
TABLET ORAL
Qty: 25 TABLET | Refills: 0 | Status: SHIPPED | OUTPATIENT
Start: 2023-05-01

## 2023-05-01 RX ORDER — IPRATROPIUM BROMIDE AND ALBUTEROL SULFATE 2.5; .5 MG/3ML; MG/3ML
3 SOLUTION RESPIRATORY (INHALATION) ONCE
Status: COMPLETED | OUTPATIENT
Start: 2023-05-01 | End: 2023-05-01

## 2023-05-01 RX ADMIN — IPRATROPIUM BROMIDE AND ALBUTEROL SULFATE 3 ML: 2.5; .5 SOLUTION RESPIRATORY (INHALATION) at 10:27

## 2023-05-01 NOTE — ASSESSMENT & PLAN NOTE
Exacerbated today continue Symbicort and as needed albuterol however I did give her an order for nebulizer and supplies and DuoNeb Nebules to use every 4 hours instead of the albuterol inhaler to get her through this exacerbation and any further future exacerbations  Azithromycin as well  Given note to return to work Wednesday sooner if able

## 2023-05-01 NOTE — LETTER
May 1, 2023     Patient: Randell Land  YOB: 1990  Date of Visit: 5/1/2023      To Whom it May Concern:    Dianne Partida is under my professional care  Ibeth Brown was seen in my office on 5/1/2023  Ibeth Brown may return to work on 5/3/23, sooner if able       If you have any questions or concerns, please don't hesitate to call           Sincerely,          Bobby Orlando PA-C        CC: No Recipients

## 2023-05-01 NOTE — PROGRESS NOTES
Name: Annabelle Hernandes      : 1990      MRN: [id number]  Encounter Provider: Patricia Joyce PA-C  Encounter Date: 2023   Encounter department: [facility] PRIMARY [facility]ARE    Assessment & Plan     1  Moderate persistent asthma with acute exacerbation  Assessment & Plan:  Exacerbated today continue Symbicort and as needed albuterol however I did give her an order for nebulizer and supplies and DuoNeb Nebules to use every 4 hours instead of the albuterol inhaler to get her through this exacerbation and any further future exacerbations  Azithromycin as well  Given note to return to work Wednesday sooner if able  Orders:  -     Mini neb  -     ipratropium-albuterol (DUO-NEB) 0 5-2 5 mg/3 mL inhalation solution 3 mL  -     azithromycin (ZITHROMAX) 250 mg tablet; Take two tablets on day one and then one tablet daily for the next four days  -     predniSONE 10 mg tablet; Day 1= 50 mg at once, date 2+3= 40 mg once daily, day 4+5= 30 mg once daily, day 6+7= 20 mg once daily, and day 8+9= 10 mg once daily   -     Nebulizer  -     Nebulizer Supplies  -     ipratropium-albuterol (DUO-NEB) 0 5-2 5 mg/3 mL nebulizer solution; Take 3 mL by nebulization 4 (four) times a day    2  Acute URI  Comments:  Azithromycin as directed  Orders:  -     Mini neb  -     ipratropium-albuterol (DUO-NEB) 0 5-2 5 mg/3 mL inhalation solution 3 mL  -     azithromycin (ZITHROMAX) 250 mg tablet; Take two tablets on day one and then one tablet daily for the next four days  -     predniSONE 10 mg tablet; Day 1= 50 mg at once, date 2+3= 40 mg once daily, day 4+5= 30 mg once daily, day 6+7= 20 mg once daily, and day 8+9= 10 mg once daily  Subjective      Patient sick for the past week mostly clear with chunks of colored yellow-green mucus  Her asthma is very flared she is finding it difficult to breathe even with her inhaler use her mother is sick people at work are sick  She does not have a nebulizer at home  "Over-the-counter without benefit  Review of Systems   Constitutional: Negative  HENT: Positive for congestion  Eyes: Negative  Respiratory: Positive for cough  Cardiovascular: Negative  Gastrointestinal: Negative  Endocrine: Negative  Genitourinary: Negative  Musculoskeletal: Negative  Skin: Negative  Allergic/Immunologic: Negative  Neurological: Negative  Hematological: Negative  Psychiatric/Behavioral: Negative  Current Outpatient Medications on File Prior to Visit   Medication Sig    ALPRAZolam (XANAX) 0 25 mg tablet Take 1 tablet (0 25 mg total) by mouth daily as needed for anxiety    budesonide-formoterol (SYMBICORT) 80-4 5 MCG/ACT inhaler     fluticasone (FLONASE) 50 mcg/act nasal spray 1 spray into each nostril daily    norgestrel-ethinyl estradiol (Cryselle-28) 0 3 mg-30 mcg per tablet Take 1 tablet by mouth daily    PROAIR  (90 Base) MCG/ACT inhaler     sertraline (ZOLOFT) 100 mg tablet Take 1 tablet (100 mg total) by mouth daily    sertraline (ZOLOFT) 50 mg tablet Take 1 tablet (50 mg total) by mouth daily TAKE DAILY WITH 100 MG ZOLOFT TO EQUAL 150 MG DAILY  Mini neb  Performed by: Milly Waller PA-C  Authorized by: Milly Waller PA-C   Universal Protocol:  Consent: Verbal consent obtained  Consent given by: patient      Number of treatments:  1  Treatment 1:   Pre-Procedure     Symptoms:  Wheezing and cough    Lung Sounds:  Scattered wheeze    Medication Administered:  Duoneb - Albuterol 2 5 mg/Atrovent 0 5 mg          Objective     /82 (BP Location: Right arm, Patient Position: Sitting, Cuff Size: Standard)   Pulse 88   Temp (!) 96 8 °F (36 °C) (Temporal)   Ht 5' 2\" (1 575 m) Comment: on file  Wt (!) 147 kg (324 lb)   SpO2 94%   BMI 59 26 kg/m²     Physical Exam  Vitals and nursing note reviewed  Constitutional:       General: She is not in acute distress  Appearance: She is well-developed   She is not " diaphoretic  HENT:      Head: Normocephalic and atraumatic  Eyes:      General:         Right eye: No discharge  Left eye: No discharge  Conjunctiva/sclera: Conjunctivae normal    Neck:      Vascular: No carotid bruit  Cardiovascular:      Rate and Rhythm: Normal rate and regular rhythm  Heart sounds: Normal heart sounds  No murmur heard  No friction rub  No gallop  Pulmonary:      Effort: Pulmonary effort is normal  No respiratory distress  Breath sounds: Examination of the right-upper field reveals wheezing  Examination of the left-upper field reveals wheezing  Examination of the right-middle field reveals wheezing  Examination of the left-middle field reveals wheezing  Examination of the right-lower field reveals wheezing  Examination of the left-lower field reveals wheezing  Wheezing present  No rales  Musculoskeletal:      Cervical back: Neck supple  Skin:     General: Skin is warm and dry  Neurological:      Mental Status: She is alert and oriented to person, place, and time     Psychiatric:         Judgment: Judgment normal        Enrique Carr PA-C

## 2023-05-01 NOTE — PATIENT INSTRUCTIONS
1  Moderate persistent asthma with acute exacerbation  Assessment & Plan:  Exacerbated today continue Symbicort and as needed albuterol however I did give her an order for nebulizer and supplies and DuoNeb Nebules to use every 4 hours instead of the albuterol inhaler to get her through this exacerbation and any further future exacerbations  Azithromycin as well  Given note to return to work Wednesday sooner if able  Orders:  -     Mini neb  -     ipratropium-albuterol (DUO-NEB) 0 5-2 5 mg/3 mL inhalation solution 3 mL  -     azithromycin (ZITHROMAX) 250 mg tablet; Take two tablets on day one and then one tablet daily for the next four days  -     predniSONE 10 mg tablet; Day 1= 50 mg at once, date 2+3= 40 mg once daily, day 4+5= 30 mg once daily, day 6+7= 20 mg once daily, and day 8+9= 10 mg once daily   -     Nebulizer  -     Nebulizer Supplies  -     ipratropium-albuterol (DUO-NEB) 0 5-2 5 mg/3 mL nebulizer solution; Take 3 mL by nebulization 4 (four) times a day    2  Acute URI  Comments:  Azithromycin as directed  Orders:  -     Mini neb  -     ipratropium-albuterol (DUO-NEB) 0 5-2 5 mg/3 mL inhalation solution 3 mL  -     azithromycin (ZITHROMAX) 250 mg tablet; Take two tablets on day one and then one tablet daily for the next four days  -     predniSONE 10 mg tablet; Day 1= 50 mg at once, date 2+3= 40 mg once daily, day 4+5= 30 mg once daily, day 6+7= 20 mg once daily, and day 8+9= 10 mg once daily

## 2023-05-02 DIAGNOSIS — J06.9 ACUTE URI: ICD-10-CM

## 2023-05-02 DIAGNOSIS — B34.9 VIRAL INFECTION, UNSPECIFIED: Primary | ICD-10-CM

## 2023-05-02 RX ORDER — BENZONATATE 200 MG/1
200 CAPSULE ORAL 3 TIMES DAILY PRN
Qty: 30 CAPSULE | Refills: 1 | Status: SHIPPED | OUTPATIENT
Start: 2023-05-02

## 2023-07-20 DIAGNOSIS — F40.243 ANXIETY WITH FLYING: ICD-10-CM

## 2023-07-21 RX ORDER — ALPRAZOLAM 0.25 MG/1
0.25 TABLET ORAL DAILY PRN
Qty: 30 TABLET | Refills: 0 | Status: SHIPPED | OUTPATIENT
Start: 2023-07-21

## 2024-01-09 DIAGNOSIS — E66.01 MORBID OBESITY (HCC): ICD-10-CM

## 2024-01-09 DIAGNOSIS — E78.2 MIXED HYPERLIPIDEMIA: Primary | ICD-10-CM

## 2024-01-24 DIAGNOSIS — Z00.6 ENCOUNTER FOR EXAMINATION FOR NORMAL COMPARISON OR CONTROL IN CLINICAL RESEARCH PROGRAM: ICD-10-CM

## 2024-01-31 DIAGNOSIS — F41.9 ANXIETY: ICD-10-CM

## 2024-01-31 RX ORDER — SERTRALINE HYDROCHLORIDE 100 MG/1
100 TABLET, FILM COATED ORAL DAILY
Qty: 90 TABLET | Refills: 1 | Status: SHIPPED | OUTPATIENT
Start: 2024-01-31

## 2024-03-05 ENCOUNTER — OFFICE VISIT (OUTPATIENT)
Dept: FAMILY MEDICINE CLINIC | Facility: CLINIC | Age: 34
End: 2024-03-05
Payer: COMMERCIAL

## 2024-03-05 VITALS
TEMPERATURE: 98.4 F | HEART RATE: 100 BPM | DIASTOLIC BLOOD PRESSURE: 80 MMHG | BODY MASS INDEX: 53.92 KG/M2 | SYSTOLIC BLOOD PRESSURE: 118 MMHG | HEIGHT: 62 IN | WEIGHT: 293 LBS | OXYGEN SATURATION: 97 %

## 2024-03-05 DIAGNOSIS — J45.41 MODERATE PERSISTENT ASTHMA WITH ACUTE EXACERBATION: ICD-10-CM

## 2024-03-05 DIAGNOSIS — J01.40 ACUTE NON-RECURRENT PANSINUSITIS: Primary | ICD-10-CM

## 2024-03-05 PROCEDURE — 99214 OFFICE O/P EST MOD 30 MIN: CPT | Performed by: NURSE PRACTITIONER

## 2024-03-05 RX ORDER — PREDNISONE 10 MG/1
TABLET ORAL
Qty: 30 TABLET | Refills: 0 | Status: SHIPPED | OUTPATIENT
Start: 2024-03-05

## 2024-03-05 RX ORDER — BENZONATATE 200 MG/1
200 CAPSULE ORAL 3 TIMES DAILY PRN
Qty: 20 CAPSULE | Refills: 0 | Status: SHIPPED | OUTPATIENT
Start: 2024-03-05

## 2024-03-05 RX ORDER — AMOXICILLIN 500 MG/1
500 CAPSULE ORAL EVERY 8 HOURS SCHEDULED
Qty: 30 CAPSULE | Refills: 0 | Status: SHIPPED | OUTPATIENT
Start: 2024-03-05 | End: 2024-03-12

## 2024-03-05 NOTE — PROGRESS NOTES
Name: Neelima Mojica      : 1990      MRN: [id number]  Encounter Provider: ARACELI Haynes  Encounter Date: 3/5/2024   Encounter department: [facility] PR[facility]    Assessment & Plan     1. Acute non-recurrent pansinusitis  Assessment & Plan:  10-day course of amoxicillin and prednisone taper were ordered for treatment of acute sinusitis.  Tessalon Perles were also ordered to be used as needed for cough.    Orders:  -     amoxicillin (AMOXIL) 500 mg capsule; Take 1 capsule (500 mg total) by mouth every 8 (eight) hours for 10 days  -     predniSONE 10 mg tablet; Use 5 tablets for 2 days. Use 4 tablets for 2 days. Use 3 tablets for 2 days. Use 2 tablets for 2 days. Use 1 tablet for 2 days.  -     benzonatate (TESSALON) 200 MG capsule; Take 1 capsule (200 mg total) by mouth 3 (three) times a day as needed for cough    2. Moderate persistent asthma with acute exacerbation  Assessment & Plan:  Patient was advised to continue using respiratory treatments as directed.  Prednisone taper was ordered to help with acute airway inflammation.    Orders:  -     predniSONE 10 mg tablet; Use 5 tablets for 2 days. Use 4 tablets for 2 days. Use 3 tablets for 2 days. Use 2 tablets for 2 days. Use 1 tablet for 2 days.  -     benzonatate (TESSALON) 200 MG capsule; Take 1 capsule (200 mg total) by mouth 3 (three) times a day as needed for cough      Depression Screening and Follow-up Plan: Patient was screened for depression during today's encounter. They screened negative with a PHQ-2 score of 0.        Subjective      URI symptoms: Patient reports over the past 2 weeks she has been experiencing symptoms of right otalgia and congestion, rhinorrhea, nasal congestion, sore throat, and non-productive cough.  Patient denies fever or chills.  She reports that she did complete a home COVID test 3 days ago which was negative.    Asthma: Patient is currently managed on Symbicort twice daily, DuoNeb 4 times daily as  needed, and ProAir as needed.  The patient reports that she has been noting some increased shortness of breath and wheezing since her symptoms began and she has needed to use her DuoNebs a few times.  She reports that this has resolved her symptoms.          Review of Systems   Constitutional:  Negative for chills and fever.   HENT:  Positive for congestion, ear pain (right), postnasal drip, rhinorrhea and sore throat. Negative for sinus pressure and sinus pain.         Right ear congestion   Eyes:  Negative for pain and visual disturbance.   Respiratory:  Positive for cough (non-productive), chest tightness, shortness of breath and wheezing.    Cardiovascular:  Negative for chest pain, palpitations and leg swelling.   Gastrointestinal:  Negative for abdominal pain, constipation, diarrhea, nausea and vomiting.   Endocrine: Negative for cold intolerance and heat intolerance.   Genitourinary:  Negative for decreased urine volume, dysuria and hematuria.   Musculoskeletal:  Negative for arthralgias, back pain and myalgias.   Skin:  Negative for color change and rash.   Allergic/Immunologic: Positive for environmental allergies.   Neurological:  Negative for dizziness, seizures, syncope, weakness, light-headedness, numbness and headaches.   Hematological:  Negative for adenopathy.   Psychiatric/Behavioral:  Negative for confusion. The patient is not nervous/anxious.    All other systems reviewed and are negative.      Current Outpatient Medications on File Prior to Visit   Medication Sig   • ALPRAZolam (XANAX) 0.25 mg tablet Take 1 tablet (0.25 mg total) by mouth daily as needed for anxiety   • budesonide-formoterol (SYMBICORT) 80-4.5 MCG/ACT inhaler    • fluticasone (FLONASE) 50 mcg/act nasal spray 1 spray into each nostril daily   • ipratropium-albuterol (DUO-NEB) 0.5-2.5 mg/3 mL nebulizer solution Take 3 mL by nebulization 4 (four) times a day   • norgestrel-ethinyl estradiol (Cryselle-28) 0.3 mg-30 mcg per tablet  "Take 1 tablet by mouth daily   • PROAIR  (90 Base) MCG/ACT inhaler    • sertraline (ZOLOFT) 100 mg tablet TAKE 1 TABLET BY MOUTH EVERY DAY   • sertraline (ZOLOFT) 50 mg tablet TAKE 1 TABLET (50 MG TOTAL) BY MOUTH DAILY TAKE DAILY WITH 100 MG ZOLOFT TO EQUAL 150 MG DAILY.   • DTx Anyi - Sleep (Sleepio/Daylight Anyi Bundle) MISC  (Patient not taking: Reported on 3/5/2024)   • [DISCONTINUED] benzonatate (TESSALON) 200 MG capsule Take 1 capsule (200 mg total) by mouth 3 (three) times a day as needed for cough (Patient not taking: Reported on 3/5/2024)   • [DISCONTINUED] predniSONE 10 mg tablet Day 1= 50 mg at once, date 2+3= 40 mg once daily, day 4+5= 30 mg once daily, day 6+7= 20 mg once daily, and day 8+9= 10 mg once daily. (Patient not taking: Reported on 3/5/2024)       Objective     /80 (BP Location: Right arm, Patient Position: Sitting, Cuff Size: Large)   Pulse 100   Temp 98.4 °F (36.9 °C) (Tympanic)   Ht 5' 2\" (1.575 m)   Wt (!) 148 kg (326 lb)   SpO2 97%   BMI 59.63 kg/m²     Physical Exam  Vitals and nursing note reviewed.   Constitutional:       General: She is not in acute distress.     Appearance: Normal appearance. She is not ill-appearing.   HENT:      Head: Normocephalic.      Right Ear: Hearing normal. A middle ear effusion (moderate) is present.      Left Ear: Hearing normal. A middle ear effusion (moderate) is present.      Mouth/Throat:      Pharynx: Posterior oropharyngeal erythema present. No pharyngeal swelling, oropharyngeal exudate or uvula swelling.      Tonsils: No tonsillar exudate or tonsillar abscesses.   Eyes:      Conjunctiva/sclera: Conjunctivae normal.   Cardiovascular:      Rate and Rhythm: Normal rate and regular rhythm.      Pulses: Normal pulses.           Carotid pulses are 2+ on the right side and 2+ on the left side.       Radial pulses are 2+ on the right side and 2+ on the left side.        Posterior tibial pulses are 2+ on the right side and 2+ on the left " side.      Heart sounds: Normal heart sounds. No murmur heard.  Pulmonary:      Effort: Pulmonary effort is normal. No respiratory distress.      Breath sounds: Normal breath sounds. No decreased breath sounds, wheezing, rhonchi or rales.   Abdominal:      General: Abdomen is flat. Bowel sounds are normal. There is no distension.      Palpations: Abdomen is soft.      Tenderness: There is no abdominal tenderness. There is no guarding.   Musculoskeletal:         General: Normal range of motion.      Cervical back: Normal range of motion.      Right lower leg: No edema.      Left lower leg: No edema.   Skin:     General: Skin is warm and dry.      Capillary Refill: Capillary refill takes less than 2 seconds.   Neurological:      General: No focal deficit present.      Mental Status: She is alert and oriented to person, place, and time.   Psychiatric:         Mood and Affect: Mood normal.         Behavior: Behavior normal.         Thought Content: Thought content normal.         Judgment: Judgment normal.       ARACELI Haynes

## 2024-03-05 NOTE — ASSESSMENT & PLAN NOTE
10-day course of amoxicillin and prednisone taper were ordered for treatment of acute sinusitis.  Tessalon Perles were also ordered to be used as needed for cough.

## 2024-03-05 NOTE — ASSESSMENT & PLAN NOTE
Patient was advised to continue using respiratory treatments as directed.  Prednisone taper was ordered to help with acute airway inflammation.

## 2024-03-12 ENCOUNTER — TELEPHONE (OUTPATIENT)
Age: 34
End: 2024-03-12

## 2024-03-12 DIAGNOSIS — J01.40 ACUTE NON-RECURRENT PANSINUSITIS: Primary | ICD-10-CM

## 2024-03-12 RX ORDER — SULFAMETHOXAZOLE AND TRIMETHOPRIM 800; 160 MG/1; MG/1
1 TABLET ORAL EVERY 12 HOURS SCHEDULED
Qty: 20 TABLET | Refills: 0 | Status: SHIPPED | OUTPATIENT
Start: 2024-03-12 | End: 2024-03-22

## 2024-03-12 NOTE — TELEPHONE ENCOUNTER
Patient can discontinue amoxicillin and start Bactrim instead.  Patient should continue prednisone until completely finished.  If this course of antibiotics does not resolve her symptoms she must be reevaluated in the office.

## 2024-03-12 NOTE — TELEPHONE ENCOUNTER
Patient stated seen 3/5/24.    Right ear pain.  Decline office visit    Pharmacy [facility] [place]    Please review and advice  Thank you

## 2024-03-27 LAB
ALBUMIN SERPL-MCNC: 3.8 G/DL (ref 3.6–5.1)
ALBUMIN/GLOB SERPL: 1.4 (CALC) (ref 1–2.5)
ALP SERPL-CCNC: 97 U/L (ref 31–125)
ALT SERPL-CCNC: 12 U/L (ref 6–29)
AST SERPL-CCNC: 12 U/L (ref 10–30)
BILIRUB SERPL-MCNC: 0.3 MG/DL (ref 0.2–1.2)
BUN SERPL-MCNC: 11 MG/DL (ref 7–25)
BUN/CREAT SERPL: NORMAL (CALC) (ref 6–22)
CALCIUM SERPL-MCNC: 8.8 MG/DL (ref 8.6–10.2)
CHLORIDE SERPL-SCNC: 105 MMOL/L (ref 98–110)
CHOLEST SERPL-MCNC: 188 MG/DL
CHOLEST/HDLC SERPL: 3.7 (CALC)
CO2 SERPL-SCNC: 30 MMOL/L (ref 20–32)
CREAT SERPL-MCNC: 0.63 MG/DL (ref 0.5–0.97)
GFR/BSA.PRED SERPLBLD CYS-BASED-ARV: 121 ML/MIN/1.73M2
GLOBULIN SER CALC-MCNC: 2.8 G/DL (CALC) (ref 1.9–3.7)
GLUCOSE SERPL-MCNC: 86 MG/DL (ref 65–99)
HBA1C MFR BLD HPLC: 5.7 %
HDLC SERPL-MCNC: 51 MG/DL
LDLC SERPL CALC-MCNC: 115 MG/DL (CALC)
NONHDLC SERPL-MCNC: 137 MG/DL (CALC)
POTASSIUM SERPL-SCNC: 4.3 MMOL/L (ref 3.5–5.3)
PROT SERPL-MCNC: 6.6 G/DL (ref 6.1–8.1)
SODIUM SERPL-SCNC: 141 MMOL/L (ref 135–146)
TRIGL SERPL-MCNC: 111 MG/DL
TSH SERPL-ACNC: 3.27 MIU/L

## 2024-04-01 ENCOUNTER — OFFICE VISIT (OUTPATIENT)
Dept: FAMILY MEDICINE CLINIC | Facility: CLINIC | Age: 34
End: 2024-04-01
Payer: COMMERCIAL

## 2024-04-01 VITALS
WEIGHT: 293 LBS | HEIGHT: 62 IN | HEART RATE: 112 BPM | DIASTOLIC BLOOD PRESSURE: 86 MMHG | BODY MASS INDEX: 53.92 KG/M2 | SYSTOLIC BLOOD PRESSURE: 122 MMHG

## 2024-04-01 DIAGNOSIS — F41.9 ANXIETY: ICD-10-CM

## 2024-04-01 DIAGNOSIS — E66.01 MORBID OBESITY (HCC): ICD-10-CM

## 2024-04-01 DIAGNOSIS — Z00.00 ENCOUNTER FOR PHYSICAL EXAMINATION: Primary | ICD-10-CM

## 2024-04-01 DIAGNOSIS — E78.2 MIXED HYPERLIPIDEMIA: ICD-10-CM

## 2024-04-01 DIAGNOSIS — F40.243 ANXIETY WITH FLYING: ICD-10-CM

## 2024-04-01 DIAGNOSIS — J45.41 MODERATE PERSISTENT ASTHMA WITH ACUTE EXACERBATION: ICD-10-CM

## 2024-04-01 PROBLEM — J01.40 ACUTE NON-RECURRENT PANSINUSITIS: Status: RESOLVED | Noted: 2021-08-24 | Resolved: 2024-04-01

## 2024-04-01 PROCEDURE — 99214 OFFICE O/P EST MOD 30 MIN: CPT | Performed by: PHYSICIAN ASSISTANT

## 2024-04-01 PROCEDURE — 99395 PREV VISIT EST AGE 18-39: CPT | Performed by: PHYSICIAN ASSISTANT

## 2024-04-01 RX ORDER — SERTRALINE HYDROCHLORIDE 100 MG/1
100 TABLET, FILM COATED ORAL DAILY
Qty: 90 TABLET | Refills: 1 | Status: SHIPPED | OUTPATIENT
Start: 2024-04-01

## 2024-04-01 RX ORDER — BUDESONIDE AND FORMOTEROL FUMARATE DIHYDRATE 80; 4.5 UG/1; UG/1
2 AEROSOL RESPIRATORY (INHALATION) 2 TIMES DAILY
Qty: 30.6 G | Refills: 3 | Status: SHIPPED | OUTPATIENT
Start: 2024-04-01

## 2024-04-01 RX ORDER — ALPRAZOLAM 0.25 MG/1
0.25 TABLET ORAL DAILY PRN
Qty: 30 TABLET | Refills: 0 | Status: SHIPPED | OUTPATIENT
Start: 2024-04-01

## 2024-04-01 NOTE — PATIENT INSTRUCTIONS
1. Encounter for physical examination  Comments:  All up todate including labs, Adacel, GYN. Mammo at 41 colon 45.    2. Morbid obesity (HCC)  Assessment & Plan:  BMI is 60. Patient highly encouraged to decrease caloric intake and increase activity. Referral to weight management placed. They should call the patient and discuss options.      Orders:  -     Ambulatory Referral to Weight Management; Future    3. Anxiety  Assessment & Plan:  Patient well controlled on current medication regimen. Currently managed with Zoloft 150mg daily and Xanax 0.25 mg PRN. Has gone through 30 in over 6 months so not abusing and PDMP checked and varified. Continue current treatment. Will refill patient's Zoloft 150 mg. Will call in 30 Xanax for patient to take PRN for anxiety.     Orders:  -     sertraline (ZOLOFT) 50 mg tablet; Take 1 tablet (50 mg total) by mouth daily TAKE DAILY WITH 100 MG ZOLOFT TO EQUAL 150 MG DAILY.  -     sertraline (ZOLOFT) 100 mg tablet; Take 1 tablet (100 mg total) by mouth daily    4. Mixed hyperlipidemia  Assessment & Plan:  Stable with LDL of 115 and slight elevation of trigs at 188. Continue to check yearly.               5. Moderate persistent asthma with acute exacerbation  Assessment & Plan:  Patient reports controlled symptoms throughout the year on Duo-Neb and Symbicort. She reports that when she gets sick, her symptoms tend to be worse. Currently taking Symbicort daily and using Duo-Neb when ill. Continue current treatment plan. Will refill patient's Symbicort and ProAir HFA. This is the first time we are prescribing for pt as she normally sees independent pulmonologist. Recommended to make apt to see the specialist as has not in about two years.     Orders:  -     ProAir  (90 Base) MCG/ACT inhaler; Inhale 2 puffs every 4 (four) hours as needed for wheezing  -     budesonide-formoterol (SYMBICORT) 80-4.5 MCG/ACT inhaler; Inhale 2 puffs 2 (two) times a day Rinse mouth after use.    6.  Anxiety with flying  -     ALPRAZolam (XANAX) 0.25 mg tablet; Take 1 tablet (0.25 mg total) by mouth daily as needed for anxiety

## 2024-04-01 NOTE — PROGRESS NOTES
ADULT ANNUAL PHYSICAL  [facility] PRIMARY CARE    NAME: Neelima Mojica  AGE: 33 y.o. SEX: female  : 1990     DATE: 2024     Assessment and Plan:     Problem List Items Addressed This Visit        Respiratory    Asthma     Patient reports controlled symptoms throughout the year on Duo-Neb and Symbicort. She reports that when she gets sick, her symptoms tend to be worse. Currently taking Symbicort daily and using Duo-Neb when ill. Continue current treatment plan. Will refill patient's Symbicort and ProAir HFA.          Relevant Medications    ProAir  (90 Base) MCG/ACT inhaler    budesonide-formoterol (SYMBICORT) 80-4.5 MCG/ACT inhaler       Behavioral Health    Anxiety     Patient well controlled on current medication regimen. Currently managed with Zoloft 150mg daily and Xanax 0.25 mg PRN. Continue current treatment. Will refill patient's Zoloft 150 mg. Will call in 30 Xanax for patient to take PRN for anxiety.          Relevant Medications    sertraline (ZOLOFT) 50 mg tablet    sertraline (ZOLOFT) 100 mg tablet       Other    Morbid obesity (HCC) - Primary     BMI is 60. Patient highly encouraged to decrease caloric intake and increase activity. Referral to weight management placed. They should call the patient and discuss options.           Relevant Orders    Ambulatory Referral to Weight Management    Mixed hyperlipidemia     Stable with LDL of 115 and slight elevation of trigs at 188. Continue to check yearly.                 Other Visit Diagnoses     Anxiety with flying        Relevant Medications    ALPRAZolam (XANAX) 0.25 mg tablet          Immunizations and preventive care screenings were discussed with patient today. Appropriate education was printed on patient's after visit summary.    Counseling:  {Annual Physical; Counselin}         No follow-ups on file.     Chief Complaint:     Chief Complaint   Patient presents with   • Annual  Exam     Offers no complaints review recent labs    • Medication Refill     On pending       History of Present Illness:     Adult Annual Physical   Patient here for a comprehensive physical exam. The patient reports {problems:18187}.    Diet and Physical Activity  Diet/Nutrition: {annual physical; diet:}.   Exercise: {annual physical; exercise:2102}.      Depression Screening  PHQ-2/9 Depression Screening    Little interest or pleasure in doing things: 0 - not at all  Feeling down, depressed, or hopeless: 0 - not at all  PHQ-2 Score: 0  PHQ-2 Interpretation: Negative depression screen       General Health  Sleep: {annual physical; sleep:2102}.   Hearing: {annual physical; hearin}.  Vision: {annual physical; vision:}.   Dental: {annual physical; dental:}.       /GYN Health  Follows with gynecology? {YES/NO:}   Patient is: {Menopause:49597}  Last menstrual period: ***  Contraceptive method: {contraceptive options:}.    Advanced Care Planning  Do you have an advanced directive? {YES/NO:}  Do you have a durable medical power of ? {YES/NO:}  ACP document given to the patient? {YES/NO:}     Review of Systems:     Review of Systems   Past Medical History:     Past Medical History:   Diagnosis Date   • Allergic 2017    Corn Pollen   • Anxiety    • Asthma    • Depression    • Headache(784.0)    • Obesity       Past Surgical History:     History reviewed. No pertinent surgical history.   Social History:     Social History     Socioeconomic History   • Marital status: Single     Spouse name: None   • Number of children: None   • Years of education: None   • Highest education level: None   Occupational History   • None   Tobacco Use   • Smoking status: Never   • Smokeless tobacco: Never   • Tobacco comments:     None   Vaping Use   • Vaping status: Never Used   Substance and Sexual Activity   • Alcohol use: Yes     Alcohol/week: 1.0 standard drink  of alcohol     Types: 1 Standard drinks or equivalent per week     Comment: Once or twice a month   • Drug use: No   • Sexual activity: Not Currently     Partners: Male     Birth control/protection: Abstinence, Condom Male, OCP     Comment: Cryselle birth control   Other Topics Concern   • None   Social History Narrative   • None     Social Determinants of Health     Financial Resource Strain: Not on file   Food Insecurity: Not on file   Transportation Needs: Not on file   Physical Activity: Not on file   Stress: Not on file   Social Connections: Not on file   Intimate Partner Violence: Not on file   Housing Stability: Not on file      Family History:     Family History   Problem Relation Age of Onset   • Hypertension Mother    • Migraines Mother    • Depression Mother    • Anxiety disorder Mother    • No Known Problems Father    • Brain cancer Maternal Grandmother    • Cancer Maternal Grandmother         Brain Cancer that spread throughout whole body   • Colon cancer Maternal Uncle       Current Medications:     Current Outpatient Medications   Medication Sig Dispense Refill   • ALPRAZolam (XANAX) 0.25 mg tablet Take 1 tablet (0.25 mg total) by mouth daily as needed for anxiety 30 tablet 0   • budesonide-formoterol (SYMBICORT) 80-4.5 MCG/ACT inhaler      • budesonide-formoterol (SYMBICORT) 80-4.5 MCG/ACT inhaler Inhale 2 puffs 2 (two) times a day Rinse mouth after use. 30.6 g 3   • fluticasone (FLONASE) 50 mcg/act nasal spray 1 spray into each nostril daily 11.1 mL 0   • ipratropium-albuterol (DUO-NEB) 0.5-2.5 mg/3 mL nebulizer solution Take 3 mL by nebulization 4 (four) times a day 25 mL 1   • norgestrel-ethinyl estradiol (Cryselle-28) 0.3 mg-30 mcg per tablet Take 1 tablet by mouth daily 84 tablet 3   • ProAir  (90 Base) MCG/ACT inhaler Inhale 2 puffs every 4 (four) hours as needed for wheezing 6.7 g 11   • sertraline (ZOLOFT) 100 mg tablet Take 1 tablet (100 mg total) by mouth daily 90 tablet 1   •  "sertraline (ZOLOFT) 50 mg tablet Take 1 tablet (50 mg total) by mouth daily TAKE DAILY WITH 100 MG ZOLOFT TO EQUAL 150 MG DAILY. 90 tablet 1     No current facility-administered medications for this visit.      Allergies:     Allergies   Allergen Reactions   • Rondec-D [Chlophedianol-Pseudoephedrine] Hyperactivity     Cough syrup;hyperactive   • Essence Garcia (Corn Pollen) Allergy Skin Test Hives, Itching, Rash and Swelling      Physical Exam:     /86 (BP Location: Right arm, Patient Position: Sitting, Cuff Size: Adult)   Pulse (!) 112   Ht 5' 2\" (1.575 m)   Wt (!) 150 kg (330 lb)   BMI 60.36 kg/m²     Physical Exam     Lyubov Chen PA-C  [facility] PRIMARY [facility]    "

## 2024-04-01 NOTE — ASSESSMENT & PLAN NOTE
Patient well controlled on current medication regimen. Currently managed with Zoloft 150mg daily and Xanax 0.25 mg PRN. Has gone through 30 in over 6 months so not abusing and PDMP checked and varified. Continue current treatment. Will refill patient's Zoloft 150 mg. Will call in 30 Xanax for patient to take PRN for anxiety.

## 2024-04-01 NOTE — ASSESSMENT & PLAN NOTE
Patient reports controlled symptoms throughout the year on Duo-Neb and Symbicort. She reports that when she gets sick, her symptoms tend to be worse. Currently taking Symbicort daily and using Duo-Neb when ill. Continue current treatment plan. Will refill patient's Symbicort and ProAir HFA. This is the first time we are prescribing for pt as she normally sees independent pulmonologist. Recommended to make apt to see the specialist as has not in about two years.

## 2024-04-01 NOTE — ASSESSMENT & PLAN NOTE
BMI is 60. Patient highly encouraged to decrease caloric intake and increase activity. Referral to weight management placed. They should call the patient and discuss options.

## 2024-04-01 NOTE — PROGRESS NOTES
ADULT ANNUAL PHYSICAL  [facility] PRIMARY CARE    NAME: Neelima Mojica  AGE: 33 y.o. SEX: female  : 1990     DATE: 2024     Assessment and Plan:     Problem List Items Addressed This Visit        Respiratory    Asthma     Patient reports controlled symptoms throughout the year on Duo-Neb and Symbicort. She reports that when she gets sick, her symptoms tend to be worse. Currently taking Symbicort daily and using Duo-Neb when ill. Continue current treatment plan. Will refill patient's Symbicort and ProAir HFA. This is the first time we are prescribing for pt as she normally sees independent pulmonologist. Recommended to make apt to see the specialist as has not in about two years.          Relevant Medications    ProAir  (90 Base) MCG/ACT inhaler    budesonide-formoterol (SYMBICORT) 80-4.5 MCG/ACT inhaler       Behavioral Health    Anxiety     Patient well controlled on current medication regimen. Currently managed with Zoloft 150mg daily and Xanax 0.25 mg PRN. Has gone through 30 in over 6 months so not abusing and PDMP checked and varified. Continue current treatment. Will refill patient's Zoloft 150 mg. Will call in 30 Xanax for patient to take PRN for anxiety.          Relevant Medications    sertraline (ZOLOFT) 50 mg tablet    sertraline (ZOLOFT) 100 mg tablet       Other    Morbid obesity (HCC)     BMI is 60. Patient highly encouraged to decrease caloric intake and increase activity. Referral to weight management placed. They should call the patient and discuss options.           Relevant Orders    Ambulatory Referral to Weight Management    Mixed hyperlipidemia     Stable with LDL of 115 and slight elevation of trigs at 188. Continue to check yearly.                 Other Visit Diagnoses     Encounter for physical examination    -  Primary    All up todate including labs, Adacel, GYN. Mammo at 41 colon 45.    Anxiety with flying        Relevant  Medications    ALPRAZolam (XANAX) 0.25 mg tablet        Immunizations and preventive care screenings were discussed with patient today. Appropriate education was printed on patient's after visit summary.    Counseling:  Alcohol/drug use: discussed moderation in alcohol intake, the recommendations for healthy alcohol use, and avoidance of illicit drug use.  Dental Health: discussed importance of regular tooth brushing, flossing, and dental visits.  Injury prevention: discussed safety/seat belts, safety helmets, smoke detectors, carbon dioxide detectors, and smoking near bedding or upholstery.  Sexual health: discussed sexually transmitted diseases, partner selection, use of condoms, avoidance of unintended pregnancy, and contraceptive alternatives.  Exercise: the importance of regular exercise/physical activity was discussed. Recommend exercise 3-5 times per week for at least 30 minutes.       Depression Screening and Follow-up Plan: Patient was screened for depression during today's encounter. They screened negative with a PHQ-2 score of 0.        Return in about 6 months (around 10/1/2024) for Recheck.     Chief Complaint:     Malka is a 33 y.o. female with a PMHx significant for morbid obesity, HLD, anxiety and asthma presenting to the office for a 1 year wellness visit. Patient has no new complaints at the office today. She has been doing well overall.     Chief Complaint   Patient presents with   • Annual Exam     Offers no complaints review recent labs    • Medication Refill     On pending       History of Present Illness:     Adult Annual Physical   Patient here for a comprehensive physical exam. The patient reports no problems.    Diet and Physical Activity  Diet/Nutrition: well balanced diet, limited junk food, and consuming 3-5 servings of fruits/vegetables daily.   Exercise: walking, 3-4 times a week on average, and less than 30 minutes on average.      Depression Screening  PHQ-2/9 Depression Screening     Little interest or pleasure in doing things: 0 - not at all  Feeling down, depressed, or hopeless: 0 - not at all  PHQ-2 Score: 0  PHQ-2 Interpretation: Negative depression screen       General Health  Sleep: sleeps poorly, gets 7-8 hours of sleep on average, experiences daytime hypersomnolence, and unrefreshing sleep.   Hearing: normal - none .  Vision: no vision problems, goes for regular eye exams, and most recent eye exam <1 year ago.   Dental: regular dental visits, brushes teeth twice daily, and flosses teeth occasionally.       /GYN Health  Follows with gynecology? yes   Patient is: non-menopausal  Last menstrual period: 03/14/2024  Contraceptive method: oral contraceptives.    Advanced Care Planning  Do you have an advanced directive? no  Do you have a durable medical power of ? yes  ACP document given to the patient? no     Review of Systems:     Review of Systems   Constitutional: Negative.  Negative for chills and fever.   HENT: Negative.  Negative for congestion, rhinorrhea and sore throat.    Eyes: Negative.  Negative for visual disturbance.   Respiratory: Negative.  Negative for cough and shortness of breath.    Cardiovascular: Negative.  Negative for chest pain and palpitations.   Gastrointestinal: Negative.  Negative for abdominal pain, diarrhea, nausea and vomiting.   Endocrine: Negative.    Genitourinary: Negative.  Negative for dysuria and hematuria.   Musculoskeletal: Negative.  Negative for back pain.   Skin: Negative.  Negative for rash.   Allergic/Immunologic: Negative.    Neurological:  Positive for headaches (intermittently). Negative for dizziness, weakness and light-headedness.   Hematological: Negative.    Psychiatric/Behavioral: Negative.     All other systems reviewed and are negative.     Past Medical History:     Past Medical History:   Diagnosis Date   • Allergic 01/2017    Corn Pollen   • Anxiety    • Asthma    • Depression    • Headache(784.0)    • Obesity       Past  Surgical History:     History reviewed. No pertinent surgical history.   Social History:     Social History     Socioeconomic History   • Marital status: Single     Spouse name: None   • Number of children: None   • Years of education: None   • Highest education level: None   Occupational History   • None   Tobacco Use   • Smoking status: Never   • Smokeless tobacco: Never   • Tobacco comments:     None   Vaping Use   • Vaping status: Never Used   Substance and Sexual Activity   • Alcohol use: Yes     Alcohol/week: 1.0 standard drink of alcohol     Types: 1 Standard drinks or equivalent per week     Comment: Once or twice a month   • Drug use: No   • Sexual activity: Not Currently     Partners: Male     Birth control/protection: Abstinence, Condom Male, OCP     Comment: Cryselle birth control   Other Topics Concern   • None   Social History Narrative   • None     Social Determinants of Health     Financial Resource Strain: Not on file   Food Insecurity: Not on file   Transportation Needs: Not on file   Physical Activity: Not on file   Stress: Not on file   Social Connections: Not on file   Intimate Partner Violence: Not on file   Housing Stability: Not on file      Family History:     Family History   Problem Relation Age of Onset   • Hypertension Mother    • Migraines Mother    • Depression Mother    • Anxiety disorder Mother    • No Known Problems Father    • Brain cancer Maternal Grandmother    • Cancer Maternal Grandmother         Brain Cancer that spread throughout whole body   • Colon cancer Maternal Uncle       Current Medications:     Current Outpatient Medications   Medication Sig Dispense Refill   • ALPRAZolam (XANAX) 0.25 mg tablet Take 1 tablet (0.25 mg total) by mouth daily as needed for anxiety 30 tablet 0   • budesonide-formoterol (SYMBICORT) 80-4.5 MCG/ACT inhaler      • budesonide-formoterol (SYMBICORT) 80-4.5 MCG/ACT inhaler Inhale 2 puffs 2 (two) times a day Rinse mouth after use. 30.6 g 3   •  "fluticasone (FLONASE) 50 mcg/act nasal spray 1 spray into each nostril daily 11.1 mL 0   • ipratropium-albuterol (DUO-NEB) 0.5-2.5 mg/3 mL nebulizer solution Take 3 mL by nebulization 4 (four) times a day 25 mL 1   • norgestrel-ethinyl estradiol (Cryselle-28) 0.3 mg-30 mcg per tablet Take 1 tablet by mouth daily 84 tablet 3   • ProAir  (90 Base) MCG/ACT inhaler Inhale 2 puffs every 4 (four) hours as needed for wheezing 6.7 g 11   • sertraline (ZOLOFT) 100 mg tablet Take 1 tablet (100 mg total) by mouth daily 90 tablet 1   • sertraline (ZOLOFT) 50 mg tablet Take 1 tablet (50 mg total) by mouth daily TAKE DAILY WITH 100 MG ZOLOFT TO EQUAL 150 MG DAILY. 90 tablet 1     No current facility-administered medications for this visit.      Allergies:     Allergies   Allergen Reactions   • Rondec-D [Chlophedianol-Pseudoephedrine] Hyperactivity     Cough syrup;hyperactive   • Essence Garcia (Corn Pollen) Allergy Skin Test Hives, Itching, Rash and Swelling      Physical Exam:     /86 (BP Location: Right arm, Patient Position: Sitting, Cuff Size: Adult)   Pulse (!) 112   Ht 5' 2\" (1.575 m)   Wt (!) 150 kg (330 lb)   BMI 60.36 kg/m²     Physical Exam  Vitals and nursing note reviewed.   Constitutional:       General: She is not in acute distress.     Appearance: Normal appearance. She is well-developed. She is morbidly obese. She is not diaphoretic.   HENT:      Head: Normocephalic and atraumatic.      Right Ear: External ear normal.      Left Ear: External ear normal.      Nose: Nose normal.      Mouth/Throat:      Pharynx: No oropharyngeal exudate.   Eyes:      General: No scleral icterus.        Right eye: No discharge.         Left eye: No discharge.      Extraocular Movements: Extraocular movements intact.      Conjunctiva/sclera: Conjunctivae normal.      Pupils: Pupils are equal, round, and reactive to light.   Neck:      Thyroid: No thyromegaly.      Vascular: No carotid bruit or JVD.      Trachea: No tracheal " deviation.   Cardiovascular:      Rate and Rhythm: Normal rate and regular rhythm.      Heart sounds: Normal heart sounds. No murmur heard.     No friction rub. No gallop.   Pulmonary:      Effort: Pulmonary effort is normal. No respiratory distress.      Breath sounds: Normal breath sounds. No stridor. No wheezing, rhonchi or rales.   Chest:      Chest wall: No tenderness.   Abdominal:      General: Bowel sounds are normal. There is no distension.      Palpations: Abdomen is soft. There is no mass.      Tenderness: There is no abdominal tenderness. There is no guarding or rebound.      Hernia: No hernia is present.   Musculoskeletal:         General: No swelling or deformity. Normal range of motion.      Cervical back: Normal range of motion and neck supple.   Lymphadenopathy:      Cervical: No cervical adenopathy.   Skin:     General: Skin is warm and dry.      Capillary Refill: Capillary refill takes more than 3 seconds.      Findings: No rash.   Neurological:      General: No focal deficit present.      Mental Status: She is alert and oriented to person, place, and time.      Motor: No abnormal muscle tone.      Coordination: Coordination normal.      Deep Tendon Reflexes: Reflexes normal.   Psychiatric:         Behavior: Behavior normal.         Thought Content: Thought content normal.         Judgment: Judgment normal.          Lyubov Chen PA-C  [facility] PRIMARY CARE

## 2024-05-07 DIAGNOSIS — N91.4 SECONDARY OLIGOMENORRHEA: ICD-10-CM

## 2024-05-07 RX ORDER — NORGESTREL-ETHINYL ESTRADIOL 0.3-0.03MG
1 TABLET ORAL DAILY
Qty: 84 TABLET | Refills: 0 | Status: SHIPPED | OUTPATIENT
Start: 2024-05-07

## 2024-05-07 NOTE — TELEPHONE ENCOUNTER
Patient called requesting refill for birth control medication. Attempted to submit med refill request, received following edit:  norgestrel-ethinyl estradiol (Cryselle-28) 0.3 mg-30 mcg per tablet (Order [id number]) was reordered and pended by Nanette Rick MD  Pending current encounter since it appears Dr. Rick submitted refill request already. Verified correct pharmacy: [facility]/pharm[facility] #[id number] - [place], [place] - [address]

## 2024-06-24 ENCOUNTER — OFFICE VISIT (OUTPATIENT)
Dept: OBGYN CLINIC | Facility: MEDICAL CENTER | Age: 34
End: 2024-06-24
Payer: COMMERCIAL

## 2024-06-24 VITALS
SYSTOLIC BLOOD PRESSURE: 160 MMHG | WEIGHT: 293 LBS | BODY MASS INDEX: 53.92 KG/M2 | HEIGHT: 62 IN | DIASTOLIC BLOOD PRESSURE: 98 MMHG

## 2024-06-24 DIAGNOSIS — Z01.419 ENCOUNTER FOR WELL WOMAN EXAM WITH ROUTINE GYNECOLOGICAL EXAM: Primary | ICD-10-CM

## 2024-06-24 DIAGNOSIS — N91.4 SECONDARY OLIGOMENORRHEA: ICD-10-CM

## 2024-06-24 PROCEDURE — 99395 PREV VISIT EST AGE 18-39: CPT | Performed by: OBSTETRICS & GYNECOLOGY

## 2024-06-24 RX ORDER — NORGESTREL-ETHINYL ESTRADIOL 0.3-0.03MG
1 TABLET ORAL DAILY
Qty: 84 TABLET | Refills: 0 | Status: SHIPPED | OUTPATIENT
Start: 2024-06-24

## 2024-06-24 NOTE — PROGRESS NOTES
"[facility] [address], [place], [place]    ASSESSMENT/PLAN: Neelima Mojica is a 34 y.o.  who presents for annual gynecologic exam.    Encounter for routine gynecologic examination  - Routine well woman exam completed today.  - Cervical Cancer Screening: Current ASCCP Guidelines reviewed. Last Pap: 2022 . Next Pap Due:   - Contraceptive counseling discussed.  Current contraception: combination OCPs     Additional problems addressed during this visit:  1. Encounter for well woman exam with routine gynecological exam  2. Secondary oligomenorrhea      CC:  Annual Gynecologic Examination    HPI: Neelima Mojica is a 34 y.o.  who presents for annual gynecologic examination.  Gynecologic Exam      She reports no new changes to her health.  She reports no breast concerns. She gets regular periods. She has no vaginal discharge, vulvar or vaginal lesions, pelvic pain, or abnormal bleeding.  She has no sexual health concerns and is not currently sexually active with one male partner.  She contracepts with OCPs.     The following portions of the patient's history were reviewed and updated as appropriate: allergies, current medications, past family history, past medical history, obstetric history, gynecologic history, past social history, past surgical history and problem list.    Review of Systems   Constitutional: Negative.    HENT: Negative.     Eyes: Negative.    Respiratory: Negative.     Cardiovascular: Negative.    Gastrointestinal: Negative.    Genitourinary: Negative.    Musculoskeletal: Negative.    All other systems reviewed and are negative.        Objective:  /98   Ht 5' 2\" (1.575 m)   Wt (!) 151 kg (332 lb)   LMP 2024 (Exact Date)   BMI 60.72 kg/m²    Physical Exam  Vitals reviewed.   Constitutional:       General: She is not in acute distress.     Appearance: She is well-developed.   HENT:      Head: Normocephalic and atraumatic.      " Nose: Nose normal.   Cardiovascular:      Rate and Rhythm: Normal rate.   Pulmonary:      Effort: Pulmonary effort is normal. No respiratory distress.   Chest:   Breasts:     Breasts are symmetrical.      Right: Normal. No mass, nipple discharge, skin change or tenderness.      Left: Normal. No mass, nipple discharge, skin change or tenderness.   Abdominal:      General: There is no distension.      Palpations: Abdomen is soft. There is no mass.      Tenderness: There is no abdominal tenderness. There is no guarding or rebound.   Genitourinary:     General: Normal vulva.      Exam position: Lithotomy position.      Labia:         Right: No lesion.         Left: No lesion.       Urethra: No prolapse (urethral meatus normal).      Vagina: Normal. No vaginal discharge, erythema or bleeding.      Cervix: Normal.      Uterus: Normal.       Adnexa: Right adnexa normal and left adnexa normal.   Musculoskeletal:         General: Normal range of motion.      Cervical back: Normal range of motion.   Lymphadenopathy:      Upper Body:      Right upper body: No supraclavicular, axillary or pectoral adenopathy.      Left upper body: No supraclavicular, axillary or pectoral adenopathy.      Lower Body: No right inguinal adenopathy. No left inguinal adenopathy.   Skin:     General: Skin is warm and dry.   Neurological:      Mental Status: She is alert and oriented to person, place, and time.   Psychiatric:         Behavior: Behavior normal.         Thought Content: Thought content normal.         Judgment: Judgment normal.

## 2024-07-25 DIAGNOSIS — N91.4 SECONDARY OLIGOMENORRHEA: ICD-10-CM

## 2024-07-25 RX ORDER — NORGESTREL-ETHINYL ESTRADIOL 0.3-0.03MG
1 TABLET ORAL DAILY
Qty: 84 TABLET | Refills: 0 | Status: SHIPPED | OUTPATIENT
Start: 2024-07-25 | End: 2024-07-29 | Stop reason: SDUPTHER

## 2024-07-29 DIAGNOSIS — N91.4 SECONDARY OLIGOMENORRHEA: ICD-10-CM

## 2024-07-29 RX ORDER — NORGESTREL-ETHINYL ESTRADIOL 0.3-0.03MG
1 TABLET ORAL DAILY
Qty: 84 TABLET | Refills: 4 | Status: SHIPPED | OUTPATIENT
Start: 2024-07-29

## 2024-08-06 DIAGNOSIS — J45.41 MODERATE PERSISTENT ASTHMA WITH ACUTE EXACERBATION: ICD-10-CM

## 2024-08-07 DIAGNOSIS — J45.41 MODERATE PERSISTENT ASTHMA WITH ACUTE EXACERBATION: Primary | ICD-10-CM

## 2024-08-07 RX ORDER — LEVALBUTEROL TARTRATE 45 UG/1
1-2 AEROSOL, METERED ORAL EVERY 4 HOURS PRN
Qty: 15 G | Refills: 1 | Status: SHIPPED | OUTPATIENT
Start: 2024-08-07

## 2024-08-07 RX ORDER — LEVALBUTEROL TARTRATE 45 UG/1
AEROSOL, METERED ORAL
Refills: 0 | OUTPATIENT
Start: 2024-08-07

## 2024-08-07 NOTE — TELEPHONE ENCOUNTER
Requested Prescriptions     Pending Prescriptions Disp Refills    levalbuterol (XOPENEX HFA) 45 mcg/act inhaler [Pharmacy Med Name: LEVALBUTEROL TAR HFA 45MCG INH]  0      LOV 4/1/24 F/U Non Scheduled, Labs Complete

## 2024-08-08 DIAGNOSIS — J45.41 MODERATE PERSISTENT ASTHMA WITH ACUTE EXACERBATION: Primary | ICD-10-CM

## 2024-08-08 RX ORDER — ALBUTEROL SULFATE 90 UG/1
2 AEROSOL, METERED RESPIRATORY (INHALATION) EVERY 4 HOURS PRN
Qty: 6.7 G | Refills: 11 | Status: SHIPPED | OUTPATIENT
Start: 2024-08-08

## 2024-08-08 NOTE — TELEPHONE ENCOUNTER
Can we clarify from pharmacy what CLARIBEL-1 means? I tried to prescribe pt albuterol inhaler instead of pro air but epic said it would not be covered by insurance so I chose xopenex instead?

## 2024-08-08 NOTE — TELEPHONE ENCOUNTER
Pharmacy comment: Alternative Requested:PROAIR BRAND HAS BEEN DISCONTINUED. PLEASE SEND NEW SCRIPT THAT IS NOT CLARIBEL-1 SO WE CAN DISPENSE GENERIC PRO AIR FOR THE PT!!!!

## 2024-08-09 ENCOUNTER — TELEPHONE (OUTPATIENT)
Dept: FAMILY MEDICINE CLINIC | Facility: CLINIC | Age: 34
End: 2024-08-09

## 2024-08-09 NOTE — TELEPHONE ENCOUNTER
Called pt today in regards of pt request for an ProAir, I have left pt an voicemail that provider has sent in the generic version of the ProAir to [facility] pharmacy on [address] instead as it shows for provider that ProAir currently not covered by insurances.     If pt calls back please advice as needed in regards of this and review 08/08/2024 message.    Thank you.  Magalie.   
no

## 2024-08-15 ENCOUNTER — OFFICE VISIT (OUTPATIENT)
Dept: FAMILY MEDICINE CLINIC | Facility: CLINIC | Age: 34
End: 2024-08-15
Payer: COMMERCIAL

## 2024-08-15 VITALS
TEMPERATURE: 98.4 F | DIASTOLIC BLOOD PRESSURE: 80 MMHG | OXYGEN SATURATION: 95 % | WEIGHT: 293 LBS | SYSTOLIC BLOOD PRESSURE: 128 MMHG | BODY MASS INDEX: 60.27 KG/M2 | HEART RATE: 98 BPM

## 2024-08-15 DIAGNOSIS — H69.93 DYSFUNCTION OF BOTH EUSTACHIAN TUBES: ICD-10-CM

## 2024-08-15 DIAGNOSIS — Z30.41 ORAL CONTRACEPTIVE USE: ICD-10-CM

## 2024-08-15 DIAGNOSIS — J02.9 SORE THROAT: Primary | ICD-10-CM

## 2024-08-15 DIAGNOSIS — L04.0 ACUTE CERVICAL ADENITIS: ICD-10-CM

## 2024-08-15 DIAGNOSIS — J45.41 MODERATE PERSISTENT ASTHMA WITH ACUTE EXACERBATION: ICD-10-CM

## 2024-08-15 DIAGNOSIS — J45.20 MILD INTERMITTENT ASTHMATIC BRONCHITIS WITHOUT COMPLICATION: ICD-10-CM

## 2024-08-15 DIAGNOSIS — J30.9 ALLERGIC RHINITIS, UNSPECIFIED SEASONALITY, UNSPECIFIED TRIGGER: ICD-10-CM

## 2024-08-15 DIAGNOSIS — H66.003 ACUTE SUPPURATIVE OTITIS MEDIA OF BOTH EARS WITHOUT SPONTANEOUS RUPTURE OF TYMPANIC MEMBRANES, RECURRENCE NOT SPECIFIED: ICD-10-CM

## 2024-08-15 PROCEDURE — 99214 OFFICE O/P EST MOD 30 MIN: CPT | Performed by: FAMILY MEDICINE

## 2024-08-15 RX ORDER — BENZONATATE 200 MG/1
200 CAPSULE ORAL 3 TIMES DAILY PRN
Qty: 30 CAPSULE | Refills: 1 | Status: SHIPPED | OUTPATIENT
Start: 2024-08-15 | End: 2024-08-25

## 2024-08-15 RX ORDER — AZITHROMYCIN 250 MG/1
TABLET, FILM COATED ORAL
Qty: 6 TABLET | Refills: 0 | Status: SHIPPED | OUTPATIENT
Start: 2024-08-15 | End: 2024-08-20

## 2024-08-15 RX ORDER — MONTELUKAST SODIUM 10 MG/1
TABLET ORAL
Qty: 30 TABLET | Refills: 5 | Status: SHIPPED | OUTPATIENT
Start: 2024-08-15

## 2024-08-15 RX ORDER — PREDNISONE 20 MG/1
TABLET ORAL
Qty: 20 TABLET | Refills: 0 | Status: SHIPPED | OUTPATIENT
Start: 2024-08-15 | End: 2024-08-25

## 2024-08-15 NOTE — PATIENT INSTRUCTIONS
Finish Zithromax as directed on box, 2-day then 1 daily for 4 days  Finish prednisone 20 mg as follows, 3 tablets once daily with food for 3 days, 2 tablets once daily food for 3 days, 1 tablet daily food for 4 days  Use Tessalon every 8 hours as needed for cough  Start Singulair/montelukast, 10 mg tablets 1 daily for allergies  Continue all other medications  Use second form of birth control there is an interaction tween birth control pills and antibiotics  Return in 1 week if still with symptoms

## 2024-08-15 NOTE — PROGRESS NOTES
Ambulatory Visit  Name: Neelima Mojica      : 1990      MRN: [id number]  Encounter Provider: Ori Albert DO  Encounter Date: 8/15/2024   Encounter department: [facility] PRIMARY CARE    1.  Sore throat, COVID-negative yesterday  2.  Asthmatic bronchitis  Zithromax prescribed  Prednisone prescribed  Tessalon prescribed  3.  Allergic rhinitis/eustachian tube dysfunction  Continue Flonase  Singulair was added  Prednisone was added  3.  Acute otitis media, Z-Mayo ordered  4.  Acute cervical adenitis, Z-Mayo ordered  5.  Asthma, stable continue present therapy Singulair was added  6.  Oral contraceptive use discussed interaction with antibiotics and birth control pills use second form of birth control for entire cycle  7.  Return in 1 week if still with symptoms      Assessment & Plan   1. Sore throat  -     predniSONE 20 mg tablet; Take 3 tablets daily for 3 days, 2 tablets daily for 3 days, 1 tablet daily for 4 days.  Take with food  2. Mild intermittent asthmatic bronchitis without complication  -     azithromycin (ZITHROMAX) 250 mg tablet; Take 2 tablets today then 1 tablet daily till finished  -     benzonatate (TESSALON) 200 MG capsule; Take 1 capsule (200 mg total) by mouth 3 (three) times a day as needed for cough for up to 10 days  -     predniSONE 20 mg tablet; Take 3 tablets daily for 3 days, 2 tablets daily for 3 days, 1 tablet daily for 4 days.  Take with food  3. Allergic rhinitis, unspecified seasonality, unspecified trigger  Assessment & Plan:  Continue Flonase, Singulair added    Orders:  -     montelukast (SINGULAIR) 10 mg tablet; Take 1 tablet daily for allergy  -     predniSONE 20 mg tablet; Take 3 tablets daily for 3 days, 2 tablets daily for 3 days, 1 tablet daily for 4 days.  Take with food  4. Moderate persistent asthma with acute exacerbation  Assessment & Plan:  Continue present therapy, Singulair is added, Medrol Dosepak added  Orders:  -     predniSONE 20 mg tablet;  Take 3 tablets daily for 3 days, 2 tablets daily for 3 days, 1 tablet daily for 4 days.  Take with food  5. Dysfunction of both eustachian tubes  -     predniSONE 20 mg tablet; Take 3 tablets daily for 3 days, 2 tablets daily for 3 days, 1 tablet daily for 4 days.  Take with food  6. Acute suppurative otitis media of both ears without spontaneous rupture of tympanic membranes, recurrence not specified  -     predniSONE 20 mg tablet; Take 3 tablets daily for 3 days, 2 tablets daily for 3 days, 1 tablet daily for 4 days.  Take with food  7. Acute cervical adenitis  -     predniSONE 20 mg tablet; Take 3 tablets daily for 3 days, 2 tablets daily for 3 days, 1 tablet daily for 4 days.  Take with food  8. Oral contraceptive use       History of Present Illness     For the past 4 days patient's had head congestion + drip scratchy throat bilateral otalgia positive cough with yellow sputum occasional wheezing.  Patient is using her allergy medication Flonase.  Patient did do COVID testing at home yesterday was negative        Review of Systems   Constitutional: Negative.    HENT:          HPI   Eyes: Negative.    Respiratory:          HPI   Cardiovascular:  Negative for chest pain.   Gastrointestinal: Negative.    Endocrine: Negative.    Genitourinary: Negative.    Musculoskeletal: Negative.    Skin: Negative.    Allergic/Immunologic: Positive for environmental allergies.   Neurological: Negative.    Hematological: Negative.    Psychiatric/Behavioral: Negative.         Objective     /80 (BP Location: Right arm, Patient Position: Sitting, Cuff Size: Large)   Pulse 98   Temp 98.4 °F (36.9 °C) (Temporal)   Wt (!) 149 kg (329 lb 8 oz)   SpO2 95%   BMI 60.27 kg/m²     Physical Exam  Vitals and nursing note reviewed.   Constitutional:       Appearance: Normal appearance. She is obese.   HENT:      Head: Normocephalic and atraumatic.      Ears:      Comments: Both tympanic membrane's are dull and injected     Nose:       Comments: Positive allergic turbinates     Mouth/Throat:      Comments: Scant clear postnasal drip minimal pharyngeal injection negative exudate  Eyes:      General: No scleral icterus.  Neck:      Comments: As of shotty tender anterior cervical adenopathy  Cardiovascular:      Rate and Rhythm: Normal rate and regular rhythm.      Heart sounds: Normal heart sounds.   Pulmonary:      Effort: Pulmonary effort is normal. No respiratory distress.      Breath sounds: No stridor. Wheezing present. No rhonchi or rales.      Comments: Coarse breath sounds with occasional end expiratory wheezing, moving a good amount of air  Chest:      Chest wall: No tenderness.   Musculoskeletal:      Cervical back: Neck supple. Tenderness present. No rigidity.   Lymphadenopathy:      Cervical: Cervical adenopathy present.   Skin:     General: Skin is warm and dry.   Neurological:      General: No focal deficit present.      Mental Status: She is alert.   Psychiatric:         Mood and Affect: Mood normal.       Administrative Statements

## 2024-08-30 ENCOUNTER — OFFICE VISIT (OUTPATIENT)
Dept: BARIATRICS | Facility: CLINIC | Age: 34
End: 2024-08-30
Payer: COMMERCIAL

## 2024-08-30 VITALS
SYSTOLIC BLOOD PRESSURE: 126 MMHG | RESPIRATION RATE: 17 BRPM | TEMPERATURE: 98.4 F | DIASTOLIC BLOOD PRESSURE: 104 MMHG | WEIGHT: 293 LBS | BODY MASS INDEX: 53.92 KG/M2 | HEART RATE: 112 BPM | HEIGHT: 62 IN

## 2024-08-30 DIAGNOSIS — E66.01 MORBID OBESITY (HCC): ICD-10-CM

## 2024-08-30 DIAGNOSIS — R73.09 ABNORMAL GLUCOSE: Primary | ICD-10-CM

## 2024-08-30 PROCEDURE — 99204 OFFICE O/P NEW MOD 45 MIN: CPT | Performed by: PHYSICIAN ASSISTANT

## 2024-08-30 RX ORDER — DEXAMETHASONE 1 MG
TABLET ORAL
Qty: 1 TABLET | Refills: 0 | Status: SHIPPED | OUTPATIENT
Start: 2024-08-30

## 2024-08-30 RX ORDER — TOPIRAMATE 25 MG/1
TABLET, FILM COATED ORAL
Qty: 60 TABLET | Refills: 1 | Status: SHIPPED | OUTPATIENT
Start: 2024-08-30

## 2024-08-30 NOTE — PROGRESS NOTES
Assessment/Plan:    Morbid obesity (HCC)  -Discussed options of HealthyCORE-Intensive Lifestyle Intervention Program, Very Low Calorie Diet-VLCD, Conservative Program, Karli-En-Y Gastric Bypass, and Vertical Sleeve Gastrectomy and the role of weight loss medications.Explained the importance of making lifestyle changes if utilizing medication to aid in weight loss  -not interested in surgery  -Initial weight loss goal of 5-10% weight loss for improved health  -Screening labs and records reviewed from prior. A1c 3/27/24 5.7  - STOP BANG-3/8    -Patient is interested in pursuing Conservative Program    Goals:  -Food log (ie.) www.[email or web address]-2200  - To drink at least 64oz of water daily.No sugary beverages.    Insurance does not cover AOM.  To recheck A1C and fasting blood sugar and consider mounjaro.  To start on topamax. BP elevated today. They have tried more than 6 months of lifestyle modifications including diet and activity changes and has had insignificant weight loss of less than 1 lb a week. Patient denies personal and family history of MCT and MEN2 tumors. Patient denies personal history of pancreatitis. Side effects discussed but not limited to diarrhea, bloating, constipation, GI upset, heartburn, increased heart rate, headache, low blood sugar, fatigue and dizziness. Titration and medication administration discussed.  Medication agreement signed    Initial Weight:338.6  Goal Weight:250        Return in about 6 years (around 8/30/2030) for 1 and 3 [facility] nurse garland.      Diagnoses and all orders for this visit:    Abnormal glucose  -     Comprehensive metabolic panel; Future  -     Hemoglobin A1C; Future  -     Comprehensive metabolic panel  -     topiramate (Topamax) 25 mg tablet; Take 1 tablet at night for 1 week and then take 1 tablet 2 times a day    Morbid obesity (HCC)  -     Ambulatory Referral to Weight Management  -     Cortisol Level, AM Specimen; Future  -      dexamethasone (DECADRON) 1 mg tablet; Take 1 tablet at 11 PM  -     Cortisol Level, AM Specimen  -     Comprehensive metabolic panel; Future  -     Hemoglobin A1C; Future  -     Comprehensive metabolic panel  -     topiramate (Topamax) 25 mg tablet; Take 1 tablet at night for 1 week and then take 1 tablet 2 times a day          Subjective:   Chief Complaint   Patient presents with    Consult     MWM-Consult;Gw-250lb ; Waist-63in; SB-3/8       Patient ID: Neelima Mojica  is a 34 y.o. female with excess weight/obesity here to pursue weight management.    Past Medical History:   Diagnosis Date    Allergic 01/2017    Corn Pollen    Anxiety     Asthma     Depression     Headache(784.0)     Obesity        HPI: Here for MWM consult  Tried WW in the past. She did ok with it.  She is interested in medication options.  Mom had RNY in the past and had complications    Denies any cravings.  Sometimes boredom eating but does notice stress eating.    Obesity/Excess Weight:  Severity:  class III with HLD.    Onset:  life    Modifiers: Diet and Exercise and Commercial Weight Loss Programs-ie. Weight Watchers, [employer], Nutrisystem, etc.  Contributing factors: Stress/Emotional Eating    Hydration:30 oz water, iced tea about 2 16 oz cups, 1 soda a day  Alcohol: 1-2 x month   Exercise:nothing formal  Occupation:pharmacy tech  Sleep:  Dining out/takeout:2-3 x week    Diet Recall:  B:   L: yogurt, sandiwch -bolgna/cheese/shelby/italian, soda, snack-varies  S:goldfish  D:protein, starch, vegetable  S: cookies or ice cream  The following portions of the patient's history were reviewed and updated as appropriate: She  has a past medical history of Allergic (01/2017), Anxiety, Asthma, Depression, Headache(784.0), and Obesity.  She   Patient Active Problem List    Diagnosis Date Noted    Allergic rhinitis 08/15/2024    Primary insomnia 01/30/2023    Asthma 01/17/2023    History of COVID-19 05/24/2022    Mixed hyperlipidemia 12/03/2019     Morbid obesity (HCC) 03/05/2019    Anxiety 12/13/2016     She  has no past surgical history on file.  Her family history includes Anxiety disorder in her mother; Brain cancer in her maternal grandmother; Cancer in her maternal grandmother; Colon cancer in her maternal grandfather and maternal uncle; Depression in her mother; Hypertension in her mother; Migraines in her mother; No Known Problems in her father.  She  reports that she has never smoked. She has never used smokeless tobacco. She reports current alcohol use of about 1.0 standard drink of alcohol per week. She reports that she does not use drugs.  Current Outpatient Medications   Medication Sig Dispense Refill    dexamethasone (DECADRON) 1 mg tablet Take 1 tablet at 11 PM 1 tablet 0    topiramate (Topamax) 25 mg tablet Take 1 tablet at night for 1 week and then take 1 tablet 2 times a day 60 tablet 1    albuterol (PROVENTIL HFA,VENTOLIN HFA) 90 mcg/act inhaler Inhale 2 puffs every 4 (four) hours as needed for wheezing 6.7 g 11    ALPRAZolam (XANAX) 0.25 mg tablet Take 1 tablet (0.25 mg total) by mouth daily as needed for anxiety 30 tablet 0    budesonide-formoterol (SYMBICORT) 80-4.5 MCG/ACT inhaler Inhale 2 puffs 2 (two) times a day Rinse mouth after use. 30.6 g 3    fluticasone (FLONASE) 50 mcg/act nasal spray 1 spray into each nostril daily 11.1 mL 0    ipratropium-albuterol (DUO-NEB) 0.5-2.5 mg/3 mL nebulizer solution Take 3 mL by nebulization 4 (four) times a day 25 mL 1    montelukast (SINGULAIR) 10 mg tablet Take 1 tablet daily for allergy 30 tablet 5    norgestrel-ethinyl estradiol (Cryselle-28) 0.3 mg-30 mcg per tablet Take 1 tablet by mouth daily 84 tablet 4    sertraline (ZOLOFT) 100 mg tablet Take 1 tablet (100 mg total) by mouth daily 90 tablet 1    sertraline (ZOLOFT) 50 mg tablet Take 1 tablet (50 mg total) by mouth daily TAKE DAILY WITH 100 MG ZOLOFT TO EQUAL 150 MG DAILY. 90 tablet 1     No current facility-administered medications for  "this visit.     Current Outpatient Medications on File Prior to Visit   Medication Sig    albuterol (PROVENTIL HFA,VENTOLIN HFA) 90 mcg/act inhaler Inhale 2 puffs every 4 (four) hours as needed for wheezing    ALPRAZolam (XANAX) 0.25 mg tablet Take 1 tablet (0.25 mg total) by mouth daily as needed for anxiety    budesonide-formoterol (SYMBICORT) 80-4.5 MCG/ACT inhaler Inhale 2 puffs 2 (two) times a day Rinse mouth after use.    fluticasone (FLONASE) 50 mcg/act nasal spray 1 spray into each nostril daily    ipratropium-albuterol (DUO-NEB) 0.5-2.5 mg/3 mL nebulizer solution Take 3 mL by nebulization 4 (four) times a day    montelukast (SINGULAIR) 10 mg tablet Take 1 tablet daily for allergy    norgestrel-ethinyl estradiol (Cryselle-28) 0.3 mg-30 mcg per tablet Take 1 tablet by mouth daily    sertraline (ZOLOFT) 100 mg tablet Take 1 tablet (100 mg total) by mouth daily    sertraline (ZOLOFT) 50 mg tablet Take 1 tablet (50 mg total) by mouth daily TAKE DAILY WITH 100 MG ZOLOFT TO EQUAL 150 MG DAILY.    [DISCONTINUED] levalbuterol (Xopenex HFA) 45 mcg/act inhaler Inhale 1-2 puffs every 4 (four) hours as needed for wheezing     No current facility-administered medications on file prior to visit.     She is allergic to rondec-d [chlophedianol-pseudoephedrine] and ashly tatum (corn pollen) allergy skin test..    Review of Systems   Constitutional:  Positive for fatigue. Negative for fever.   Respiratory:  Negative for shortness of breath.    Cardiovascular:  Negative for chest pain and palpitations.   Gastrointestinal:  Negative for abdominal pain, constipation, diarrhea and vomiting.   Genitourinary:  Negative for difficulty urinating.   Skin:  Negative for rash.   Neurological:  Negative for headaches.   Psychiatric/Behavioral:  Negative for dysphoric mood. The patient is not nervous/anxious.        Objective:    BP (!) 126/104   Pulse (!) 112   Temp 98.4 °F (36.9 °C)   Resp 17   Ht 5' 1.5\" (1.562 m)   Wt (!) 154 kg " (338 lb 9.6 oz)   BMI 62.94 kg/m²     Physical Exam  Vitals and nursing note reviewed.   Constitutional:       General: She is not in acute distress.     Appearance: She is well-developed. She is obese.   HENT:      Head: Normocephalic and atraumatic.   Eyes:      Conjunctiva/sclera: Conjunctivae normal.   Neck:      Thyroid: No thyromegaly.   Pulmonary:      Effort: Pulmonary effort is normal. No respiratory distress.   Skin:     Findings: No rash (visible).   Neurological:      Mental Status: She is alert and oriented to person, place, and time.   Psychiatric:         Mood and Affect: Mood normal.         Behavior: Behavior normal.

## 2024-08-30 NOTE — ASSESSMENT & PLAN NOTE
-Discussed options of HealthyCORE-Intensive Lifestyle Intervention Program, Very Low Calorie Diet-VLCD, Conservative Program, Karli-En-Y Gastric Bypass, and Vertical Sleeve Gastrectomy and the role of weight loss medications.Explained the importance of making lifestyle changes if utilizing medication to aid in weight loss  -not interested in surgery  -Initial weight loss goal of 5-10% weight loss for improved health  -Screening labs and records reviewed from prior. A1c 3/27/24 5.7  - STOP BANG-3/8    -Patient is interested in pursuing Conservative Program    Goals:  -Food log (ie.) www.[email or web address]2200  - To drink at least 64oz of water daily.No sugary beverages.    Insurance does not cover AOM.  To recheck A1C and fasting blood sugar and consider mounjaro.  To start on topamax. BP elevated today. They have tried more than 6 months of lifestyle modifications including diet and activity changes and has had insignificant weight loss of less than 1 lb a week. Patient denies personal and family history of MCT and MEN2 tumors. Patient denies personal history of pancreatitis. Side effects discussed but not limited to diarrhea, bloating, constipation, GI upset, heartburn, increased heart rate, headache, low blood sugar, fatigue and dizziness. Titration and medication administration discussed.  Medication agreement signed    Initial Weight:338.6  Goal Weight:250

## 2024-09-03 DIAGNOSIS — E66.01 MORBID OBESITY (HCC): Primary | ICD-10-CM

## 2024-09-05 ENCOUNTER — TELEPHONE (OUTPATIENT)
Dept: BARIATRICS | Facility: CLINIC | Age: 34
End: 2024-09-05

## 2024-09-05 NOTE — TELEPHONE ENCOUNTER
PA for Wegovy 0.25 mg  APPROVED     Date(s) approved 9/5/2024-4/3/2025    Case #    Patient advised by          []MedPlastshart Message  []Phone call   [x]LMOM  []L/M to call office as no active Communication consent on file  []Unable to leave detailed message as VM not approved on Communication consent       Pharmacy advised by    [x]Fax  []Phone call    Approval letter scanned into Media Yes

## 2024-09-05 NOTE — TELEPHONE ENCOUNTER
PA for Wegovy 0.25 mg SUBMITTED     via    [x]CMM-KEY: [id number]  []SurescriMediaBoost-Case ID #    []Faxed to plan   []Other website    []Phone call Case ID #      Office notes sent, clinical questions answered. Awaiting determination    Turnaround time for your insurance to make a decision on your Prior Authorization can take 7-21 business days.

## 2024-10-01 ENCOUNTER — CLINICAL SUPPORT (OUTPATIENT)
Dept: BARIATRICS | Facility: CLINIC | Age: 34
End: 2024-10-01

## 2024-10-01 VITALS
SYSTOLIC BLOOD PRESSURE: 128 MMHG | HEIGHT: 62 IN | BODY MASS INDEX: 53.92 KG/M2 | WEIGHT: 293 LBS | DIASTOLIC BLOOD PRESSURE: 84 MMHG | TEMPERATURE: 98.6 F | RESPIRATION RATE: 17 BRPM | HEART RATE: 80 BPM

## 2024-10-01 DIAGNOSIS — R63.5 ABNORMAL WEIGHT GAIN: Primary | ICD-10-CM

## 2024-10-01 PROCEDURE — RECHECK

## 2024-10-01 NOTE — PROGRESS NOTES
Patient last visit weight: 338lbs   Patient current visit weight: 334.2lbs     If you are taking phentermine or other oral weight loss medications, are you experiencing any of the following symptoms:  Headache:   Blurred Vision:   Chest Pain:   Palpitations:  Insomnia:   SPECIFY ORAL MEDICATION AND DOSAGE:     If you are taking an injectable medication,  are you experiencing any of the following symptoms:  Bloating: NO   Nausea: NO   Vomiting: NO   Constipation: NO   Diarrhea: NO   SPECIFY INJECTABLE MEDICATION AND CURRENT DOSAGE: Wegovy 0.25mg. Patient tolerating well and is requesting dose increase.     Vitals:    Is BP less than 100/60? NO   Is BP greater than 140/90? NO   Is HR greater than 100? NO   **If yes to any of the above, have patient relax and repeat in 5-10 minutes**    Repeat values:    Is BP less than 100/60?  Is BP greater than 140/90?  Is HR greater than 100?  **If values remain outside of ranges above, please consult provider for next steps**

## 2024-10-02 DIAGNOSIS — E66.01 MORBID OBESITY (HCC): Primary | ICD-10-CM

## 2024-10-31 DIAGNOSIS — F40.243 ANXIETY WITH FLYING: ICD-10-CM

## 2024-10-31 DIAGNOSIS — F41.9 ANXIETY: ICD-10-CM

## 2024-10-31 RX ORDER — ALPRAZOLAM 0.25 MG/1
0.25 TABLET ORAL DAILY PRN
Qty: 30 TABLET | Refills: 0 | Status: SHIPPED | OUTPATIENT
Start: 2024-10-31

## 2024-10-31 RX ORDER — SERTRALINE HYDROCHLORIDE 100 MG/1
100 TABLET, FILM COATED ORAL DAILY
Qty: 90 TABLET | Refills: 1 | Status: SHIPPED | OUTPATIENT
Start: 2024-10-31

## 2024-11-04 DIAGNOSIS — E66.01 MORBID OBESITY (HCC): Primary | ICD-10-CM

## 2024-12-02 ENCOUNTER — CLINICAL SUPPORT (OUTPATIENT)
Dept: BARIATRICS | Facility: CLINIC | Age: 34
End: 2024-12-02

## 2024-12-02 VITALS
HEIGHT: 62 IN | RESPIRATION RATE: 17 BRPM | WEIGHT: 293 LBS | DIASTOLIC BLOOD PRESSURE: 90 MMHG | SYSTOLIC BLOOD PRESSURE: 138 MMHG | HEART RATE: 94 BPM | TEMPERATURE: 98.7 F | BODY MASS INDEX: 53.92 KG/M2

## 2024-12-02 DIAGNOSIS — E66.01 MORBID OBESITY (HCC): Primary | ICD-10-CM

## 2024-12-02 DIAGNOSIS — R63.5 ABNORMAL WEIGHT GAIN: Primary | ICD-10-CM

## 2024-12-02 PROCEDURE — RECHECK

## 2024-12-02 NOTE — PROGRESS NOTES
Patient last visit weight: 334.2lbs   Patient current visit weight: 323.2lbs    If you are taking phentermine or other oral weight loss medications, are you experiencing any of the following symptoms:  Headache:   Blurred Vision:   Chest Pain:   Palpitations:  Insomnia:   SPECIFY ORAL MEDICATION AND DOSAGE:     If you are taking an injectable medication,  are you experiencing any of the following symptoms:  Bloating: NO   Nausea: NO   Vomiting: NO   Constipation: NO   Diarrhea:NO   SPECIFY INJECTABLE MEDICATION AND CURRENT DOSAGE: Wegovy 1mg. Patient tolerating well and is requesting dose increase. Uses [facility] [place]       Vitals:    Is BP less than 100/60? NO   Is BP greater than 140/90? NO   Is HR greater than 100? NO   **If yes to any of the above, have patient relax and repeat in 5-10 minutes**    Repeat values:    Is BP less than 100/60?  Is BP greater than 140/90?  Is HR greater than 100?  **If values remain outside of ranges above, please consult provider for next steps**

## 2024-12-19 DIAGNOSIS — J45.41 MODERATE PERSISTENT ASTHMA WITH ACUTE EXACERBATION: ICD-10-CM

## 2024-12-20 RX ORDER — DILTIAZEM HYDROCHLORIDE 60 MG/1
TABLET, FILM COATED ORAL
Qty: 10.2 G | Refills: 3 | Status: SHIPPED | OUTPATIENT
Start: 2024-12-20

## 2024-12-20 NOTE — TELEPHONE ENCOUNTER
Requested Prescriptions     Pending Prescriptions Disp Refills    Symbicort 80-4.5 MCG/ACT inhaler [Pharmacy Med Name: SYMBICORT 80-4.5 MCG INHALER]  3     Sig: INHALE 2 PUFFS BY MOUTH 2 TIMES A DAY RINSE MOUTH AFTER USE.      Pharmacy comment: Alternative Requested:Due to a formulary change, consider a covered alt and evaluate if appropriate for your patients indication and treatment goals. [facility] budesonide-formoterol, fluticasone-salmeterol  (00.

## 2024-12-30 DIAGNOSIS — E66.01 MORBID OBESITY (HCC): Primary | ICD-10-CM

## 2024-12-30 DIAGNOSIS — R11.0 NAUSEA: ICD-10-CM

## 2024-12-30 RX ORDER — ONDANSETRON 4 MG/1
4 TABLET, FILM COATED ORAL EVERY 8 HOURS PRN
Qty: 20 TABLET | Refills: 0 | Status: SHIPPED | OUTPATIENT
Start: 2024-12-30

## 2025-01-18 DIAGNOSIS — E66.01 MORBID OBESITY (HCC): ICD-10-CM

## 2025-01-21 DIAGNOSIS — E66.01 MORBID OBESITY (HCC): ICD-10-CM

## 2025-01-21 DIAGNOSIS — R11.0 NAUSEA: ICD-10-CM

## 2025-01-21 RX ORDER — SEMAGLUTIDE 1 MG/.5ML
INJECTION, SOLUTION SUBCUTANEOUS
OUTPATIENT
Start: 2025-01-21

## 2025-01-23 RX ORDER — ONDANSETRON 4 MG/1
TABLET, FILM COATED ORAL
Qty: 20 TABLET | Refills: 0 | Status: SHIPPED | OUTPATIENT
Start: 2025-01-23

## 2025-03-07 DIAGNOSIS — E78.2 MIXED HYPERLIPIDEMIA: Primary | ICD-10-CM

## 2025-03-07 DIAGNOSIS — E66.01 MORBID OBESITY (HCC): ICD-10-CM

## 2025-03-12 ENCOUNTER — TELEPHONE (OUTPATIENT)
Age: 35
End: 2025-03-12

## 2025-03-12 NOTE — TELEPHONE ENCOUNTER
Patient contacted the office, asking if lab orders from 03/07/25 can please be faxed to [email or web address] [phone]. Also if they can be mailed to her. She is not going to complete the blood work until 03/28/25.     Home Address:  [address] [place] [address]  [place] [id number]

## 2025-03-29 LAB
ALBUMIN SERPL-MCNC: 4.2 G/DL (ref 3.6–5.1)
ALBUMIN/GLOB SERPL: 1.2 (CALC) (ref 1–2.5)
ALP SERPL-CCNC: 106 U/L (ref 31–125)
ALT SERPL-CCNC: 46 U/L (ref 6–29)
AST SERPL-CCNC: 24 U/L (ref 10–30)
BASOPHILS # BLD AUTO: 45 CELLS/UL (ref 0–200)
BASOPHILS NFR BLD AUTO: 0.5 %
BILIRUB SERPL-MCNC: 0.4 MG/DL (ref 0.2–1.2)
BUN SERPL-MCNC: 10 MG/DL (ref 7–25)
BUN/CREAT SERPL: ABNORMAL (CALC) (ref 6–22)
CALCIUM SERPL-MCNC: 9.6 MG/DL (ref 8.6–10.2)
CHLORIDE SERPL-SCNC: 103 MMOL/L (ref 98–110)
CHOLEST SERPL-MCNC: 197 MG/DL
CHOLEST/HDLC SERPL: 4.3 (CALC)
CO2 SERPL-SCNC: 31 MMOL/L (ref 20–32)
CORTIS AM PEAK SERPL-MCNC: 0.8 MCG/DL
CREAT SERPL-MCNC: 0.74 MG/DL (ref 0.5–0.97)
EOSINOPHIL # BLD AUTO: 71 CELLS/UL (ref 15–500)
EOSINOPHIL NFR BLD AUTO: 0.8 %
ERYTHROCYTE [DISTWIDTH] IN BLOOD BY AUTOMATED COUNT: 14.1 % (ref 11–15)
GFR/BSA.PRED SERPLBLD CYS-BASED-ARV: 109 ML/MIN/1.73M2
GLOBULIN SER CALC-MCNC: 3.4 G/DL (CALC) (ref 1.9–3.7)
GLUCOSE SERPL-MCNC: 102 MG/DL (ref 65–99)
HBA1C MFR BLD: 5.4 % OF TOTAL HGB
HCT VFR BLD AUTO: 44.6 % (ref 35–45)
HDLC SERPL-MCNC: 46 MG/DL
HGB BLD-MCNC: 13.8 G/DL (ref 11.7–15.5)
LDLC SERPL CALC-MCNC: 130 MG/DL (CALC)
LYMPHOCYTES # BLD AUTO: 1647 CELLS/UL (ref 850–3900)
LYMPHOCYTES NFR BLD AUTO: 18.5 %
MCH RBC QN AUTO: 25.7 PG (ref 27–33)
MCHC RBC AUTO-ENTMCNC: 30.9 G/DL (ref 32–36)
MCV RBC AUTO: 83.2 FL (ref 80–100)
MONOCYTES # BLD AUTO: 329 CELLS/UL (ref 200–950)
MONOCYTES NFR BLD AUTO: 3.7 %
NEUTROPHILS # BLD AUTO: 6809 CELLS/UL (ref 1500–7800)
NEUTROPHILS NFR BLD AUTO: 76.5 %
NONHDLC SERPL-MCNC: 151 MG/DL (CALC)
PLATELET # BLD AUTO: 383 THOUSAND/UL (ref 140–400)
PMV BLD REES-ECKER: 11.4 FL (ref 7.5–12.5)
POTASSIUM SERPL-SCNC: 4.8 MMOL/L (ref 3.5–5.3)
PROT SERPL-MCNC: 7.6 G/DL (ref 6.1–8.1)
RBC # BLD AUTO: 5.36 MILLION/UL (ref 3.8–5.1)
SODIUM SERPL-SCNC: 139 MMOL/L (ref 135–146)
TRIGL SERPL-MCNC: 103 MG/DL
WBC # BLD AUTO: 8.9 THOUSAND/UL (ref 3.8–10.8)

## 2025-03-31 ENCOUNTER — TELEPHONE (OUTPATIENT)
Dept: BARIATRICS | Facility: CLINIC | Age: 35
End: 2025-03-31

## 2025-03-31 ENCOUNTER — RESULTS FOLLOW-UP (OUTPATIENT)
Dept: BARIATRICS | Facility: CLINIC | Age: 35
End: 2025-03-31

## 2025-03-31 NOTE — TELEPHONE ENCOUNTER
PA for Wegovy 1mg SUBMITTED to [facility]     via    [x]CMM-KEY: [id number]  []Surescripts-Case ID #   []Availity-Auth ID # NDC #   []Faxed to plan   []Other website   []Phone call Case ID #     []PA sent as URGENT    All office notes, labs and other pertaining documents and studies sent. Clinical questions answered. Awaiting determination from insurance company.     Turnaround time for your insurance to make a decision on your Prior Authorization can take 7-21 business days.

## 2025-04-01 DIAGNOSIS — E66.01 MORBID OBESITY (HCC): ICD-10-CM

## 2025-04-01 NOTE — TELEPHONE ENCOUNTER
PA for Wegovy 1mg DENIED    Reason:(Screenshot if applicable)    Plan only covers this drug when you experience benefits from the drug, they are stating she has not had good outcomes.    Used initial weight 338lbs and current weight 323.2lbs     Message sent to office clinical pool Yes    Denial letter scanned into Media Yes    Appeal started No (Provider will need to decide if appeal is warranted and send clinical documentation to Prior Authorization Team for initiation.)    **Please follow up with your patient regarding denial and next steps**

## 2025-04-02 RX ORDER — SEMAGLUTIDE 1 MG/.5ML
INJECTION, SOLUTION SUBCUTANEOUS
Refills: 2 | OUTPATIENT
Start: 2025-04-02

## 2025-04-03 ENCOUNTER — CLINICAL SUPPORT (OUTPATIENT)
Dept: BARIATRICS | Facility: CLINIC | Age: 35
End: 2025-04-03

## 2025-04-03 VITALS
TEMPERATURE: 98.2 F | DIASTOLIC BLOOD PRESSURE: 82 MMHG | HEIGHT: 62 IN | HEART RATE: 85 BPM | BODY MASS INDEX: 53.92 KG/M2 | RESPIRATION RATE: 17 BRPM | WEIGHT: 293 LBS | SYSTOLIC BLOOD PRESSURE: 126 MMHG

## 2025-04-03 DIAGNOSIS — E66.01 MORBID OBESITY (HCC): ICD-10-CM

## 2025-04-03 DIAGNOSIS — R63.5 ABNORMAL WEIGHT GAIN: Primary | ICD-10-CM

## 2025-04-03 PROCEDURE — RECHECK

## 2025-04-03 RX ORDER — SEMAGLUTIDE 1 MG/.5ML
INJECTION, SOLUTION SUBCUTANEOUS
Qty: 2 ML | Refills: 0 | Status: SHIPPED | OUTPATIENT
Start: 2025-04-03 | End: 2025-05-03

## 2025-04-03 NOTE — PROGRESS NOTES
Patient last visit weight: 323.2lbs   Patient current visit weight: 296.2lbs     If you are taking phentermine or other oral weight loss medications, are you experiencing any of the following symptoms:  Headache:   Blurred Vision:   Chest Pain:   Palpitations:  Insomnia:   SPECIFY ORAL MEDICATION AND DOSAGE:     If you are taking an injectable medication,  are you experiencing any of the following symptoms:  Bloating: NO   Nausea: NO   Vomiting: NO   Constipation: NO   Diarrhea: NO   SPECIFY INJECTABLE MEDICATION AND CURRENT DOSAGE: Wegovy 1mg. Patient needs refill.   Vitals:    Is BP less than 100/60? NO   Is BP greater than 140/90? NO   Is HR greater than 100? NO   **If yes to any of the above, have patient relax and repeat in 5-10 minutes**    Repeat values:    Is BP less than 100/60?  Is BP greater than 140/90?  Is HR greater than 100?  **If values remain outside of ranges above, please consult provider for next steps**      Date of last injection:    How many injections do you have left:

## 2025-04-08 ENCOUNTER — TELEPHONE (OUTPATIENT)
Dept: OTHER | Facility: OTHER | Age: 35
End: 2025-04-08

## 2025-04-09 ENCOUNTER — RESULTS FOLLOW-UP (OUTPATIENT)
Dept: FAMILY MEDICINE CLINIC | Facility: CLINIC | Age: 35
End: 2025-04-09

## 2025-04-09 NOTE — TELEPHONE ENCOUNTER
Patient is calling regarding cancelling an appointment.    Date/Time: 0/09 @750     Patient was rescheduled: YES [] NO [x]    Patient requesting call back to reschedule: YES [] NO [x]    Pt will call back to reschedule

## 2025-04-09 NOTE — TELEPHONE ENCOUNTER
Patient calling to state she received call she was a no call/no-show for this morning's appointment. She is upset and afraid she will be penalized for this. She did call [facility] yesterday evening to cancel. Message was forwarded to clerical in-basket high priority. I have properly canceled appointment from appointment desk. Patient rescheduled her visit via [employer]. Please advise.

## 2025-04-09 NOTE — RESULT ENCOUNTER NOTE
PT no showed for her apt today. Can we please call her and have her reschedule to review her lab results. Thank you.

## 2025-04-09 NOTE — TELEPHONE ENCOUNTER
Called pt and advised she will not be penalized in regards of today's appointment seeing pt had canceled the appointment as needed before the end of the day and rescheduled as needed.     Pt gave a verbal understanding.     Thank you,   Magalie    Patient Seen in: [address]    History   Patient presents with:  Sore Throat    Stated Complaint: Sore Throat    HPI    Patient here with her  who was diagnosed with strep after testing positive on the rapid test treat empirically with Z-Jason based on exposure and clinical exam.    Disposition:  Discharge    Follow-up:  Daphnie Chua MD  [address] [address], [address] [facility] [id number]      As needed      Medications Prescribed:  Discharge Saeid Byrnes

## 2025-04-23 ENCOUNTER — OFFICE VISIT (OUTPATIENT)
Dept: FAMILY MEDICINE CLINIC | Facility: CLINIC | Age: 35
End: 2025-04-23
Payer: COMMERCIAL

## 2025-04-23 VITALS
DIASTOLIC BLOOD PRESSURE: 88 MMHG | HEIGHT: 62 IN | BODY MASS INDEX: 53.92 KG/M2 | OXYGEN SATURATION: 99 % | WEIGHT: 293 LBS | SYSTOLIC BLOOD PRESSURE: 138 MMHG | HEART RATE: 84 BPM

## 2025-04-23 DIAGNOSIS — E78.2 MIXED HYPERLIPIDEMIA: ICD-10-CM

## 2025-04-23 DIAGNOSIS — R73.9 HYPERGLYCEMIA: ICD-10-CM

## 2025-04-23 DIAGNOSIS — F41.9 ANXIETY: ICD-10-CM

## 2025-04-23 DIAGNOSIS — E66.01 MORBID OBESITY (HCC): ICD-10-CM

## 2025-04-23 DIAGNOSIS — Z00.00 ANNUAL PHYSICAL EXAM: Primary | ICD-10-CM

## 2025-04-23 DIAGNOSIS — R71.8 MICROCYTIC RED BLOOD CELLS: ICD-10-CM

## 2025-04-23 DIAGNOSIS — J45.41 MODERATE PERSISTENT ASTHMA WITH ACUTE EXACERBATION: ICD-10-CM

## 2025-04-23 DIAGNOSIS — J45.20 MILD INTERMITTENT ASTHMATIC BRONCHITIS WITHOUT COMPLICATION: ICD-10-CM

## 2025-04-23 PROBLEM — J30.9 ALLERGIC RHINITIS: Status: RESOLVED | Noted: 2024-08-15 | Resolved: 2025-04-23

## 2025-04-23 PROBLEM — Z86.16 HISTORY OF COVID-19: Status: RESOLVED | Noted: 2022-05-24 | Resolved: 2025-04-23

## 2025-04-23 PROCEDURE — 99213 OFFICE O/P EST LOW 20 MIN: CPT | Performed by: PHYSICIAN ASSISTANT

## 2025-04-23 PROCEDURE — 99395 PREV VISIT EST AGE 18-39: CPT | Performed by: PHYSICIAN ASSISTANT

## 2025-04-23 RX ORDER — INHALER, ASSIST DEVICES
SPACER (EA) MISCELLANEOUS
COMMUNITY
Start: 2025-04-22

## 2025-04-23 NOTE — ASSESSMENT & PLAN NOTE
Fasting glucose 102 decrease simple carbohydrates including bread Posta potatoes rice junk for dessert and sweets. A1C was only 5.4.

## 2025-04-23 NOTE — PROGRESS NOTES
Adult Annual Physical  Name: Neelima Mojica      : 1990      MRN: [id number]  Encounter Provider: Lyubov Chen PA-C  Encounter Date: 2025   Encounter department: [facility]    :  Assessment & Plan  Annual physical exam  Living will given tetanus up-to-date.  Blood work up-to-date.  Discussed Prevnar for asthma indication.       Mild intermittent asthmatic bronchitis without complication  Stable and pt sees allergist.        Mixed hyperlipidemia  Patient is on no medication for her cholesterol her LDL is 130 which for her age and diagnoses is appropriate.  Low HDL at 46 increase activity.       Moderate persistent asthma with acute exacerbation  Patient should have Prevnar vaccination.       Morbid obesity (HCC)  Currently seeing weight management on semaglutide       Hyperglycemia  Fasting glucose 102 decrease simple carbohydrates including bread Posta potatoes rice junk for dessert and sweets. A1C was only 5.4.        Microcytic red blood cells  Would recommend checking iron levels.  Orders:  •  Iron Panel (Includes Ferritin, Iron Sat%, Iron, and TIBC)    Anxiety  Still doing well with the 150 mg of Zoloft.          Neelima Mojica is here for chronic conditions f/u. Pt. had labs done prior to today's visit which included   Recent Results (from the past 4 weeks)   CBC and differential    Collection Time: 25  8:18 AM   Result Value Ref Range    White Blood Cell Count 8.9 3.8 - 10.8 Thousand/uL    Red Blood Cell Count 5.36 (H) 3.80 - 5.10 Million/uL    Hemoglobin 13.8 11.7 - 15.5 g/dL    HCT 44.6 35.0 - 45.0 %    MCV 83.2 80.0 - 100.0 fL    MCH 25.7 (L) 27.0 - 33.0 pg    MCHC 30.9 (L) 32.0 - 36.0 g/dL    RDW 14.1 11.0 - 15.0 %    Platelet Count 383 140 - 400 Thousand/uL    SL AMB MPV 11.4 7.5 - 12.5 fL    Neutrophils (Absolute) 6,809 1,500 - 7,800 cells/uL    Lymphocytes (Absolute) 1,647 850 - 3,900 cells/uL    Monocytes (Absolute) 329 200 - 950 cells/uL    Eosinophils  (Absolute) 71 15 - 500 cells/uL    Basophils ABS 45 0 - 200 cells/uL    Neutrophils 76.5 %    Lymphocytes 18.5 %    Monocytes 3.7 %    Eosinophils 0.8 %    Basophils PCT 0.5 %   Lipid panel    Collection Time: 03/28/25  8:18 AM   Result Value Ref Range    Total Cholesterol 197 <200 mg/dL    HDL 46 (L) > OR = 50 mg/dL    Triglycerides 103 <150 mg/dL    LDL Calculated 130 (H) mg/dL (calc)    Chol HDLC Ratio 4.3 <5.0 (calc)    Non-HDL Cholesterol 151 (H) <130 mg/dL (calc)   Comprehensive metabolic panel    Collection Time: 03/28/25  8:22 AM   Result Value Ref Range    Glucose, Random 102 (H) 65 - 99 mg/dL    BUN 10 7 - 25 mg/dL    Creatinine 0.74 0.50 - 0.97 mg/dL    eGFR 109 > OR = 60 mL/min/1.73m2    SL AMB BUN/CREATININE RATIO SEE NOTE: 6 - 22 (calc)    Sodium 139 135 - 146 mmol/L    Potassium 4.8 3.5 - 5.3 mmol/L    Chloride 103 98 - 110 mmol/L    CO2 31 20 - 32 mmol/L    Calcium 9.6 8.6 - 10.2 mg/dL    Protein, Total 7.6 6.1 - 8.1 g/dL    Albumin 4.2 3.6 - 5.1 g/dL    Globulin 3.4 1.9 - 3.7 g/dL (calc)    Albumin/Globulin Ratio 1.2 1.0 - 2.5 (calc)    TOTAL BILIRUBIN 0.4 0.2 - 1.2 mg/dL    Alkaline Phosphatase 106 31 - 125 U/L    AST 24 10 - 30 U/L    ALT 46 (H) 6 - 29 U/L   Hemoglobin A1c (w/out EAG)    Collection Time: 03/28/25  8:22 AM   Result Value Ref Range    Hemoglobin A1C 5.4 <5.7 % of total Hgb   Cortisol Level, AM Specimen    Collection Time: 03/28/25  8:24 AM   Result Value Ref Range    Cortisol AM 0.8 (L) mcg/dL         Preventive Screenings:  - Diabetes Screening: screening up-to-date  - Cholesterol Screening: screening not indicated, has hyperlipidemia and screening up-to-date   - Hepatitis C screening: screening up-to-date   - HIV screening: risks/benefits discussed   - Cervical cancer screening: risks/benefits discussed   - Colon cancer screening: screening not indicated   - Lung cancer screening: screening not indicated     Immunizations:  - Immunizations due: Prevnar 20      Depression Screening  "and Follow-up Plan: Patient was screened for depression during today's encounter. They screened negative with a PHQ-2 score of 0.          History of Present Illness     Adult Annual Physical:  Patient presents for annual physical.     Diet and Physical Activity:  - Diet/Nutrition: heart healthy (low sodium) diet, limited junk food, low fat diet, low carb diet, consuming 3-5 servings of fruits/vegetables daily, adequate fiber intake and adequate whole grain intake.  - Exercise: walking, moderate cardiovascular exercise, 1-2 times a week on average, 3-4 times a week on average, 30-60 minutes on average and 1-2 hours on average.    Depression Screening:  - PHQ-2 Score: 0    General Health:  - Sleep: 7-8 hours of sleep on average and unrefreshing sleep.  - Hearing: normal hearing right ear, normal hearing left ear and normal hearing bilateral ears.  - Vision: most recent eye exam < 1 year ago and wears glasses.  - Dental: regular dental visits, brushes teeth once daily and floss regularly.    /GYN Health:  - Follows with GYN: yes.   - Last menstrual cycle: 3/4/2025.   - History of STDs: no  - Contraception: oral contraceptives.      Advanced Care Planning:  - Has an advanced directive?: no    - Has a durable medical POA?: yes    - ACP document given to patient?: yes      Review of Systems   Constitutional: Negative.    HENT: Negative.     Eyes: Negative.    Respiratory: Negative.     Cardiovascular: Negative.    Gastrointestinal: Negative.    Endocrine: Negative.    Genitourinary: Negative.    Musculoskeletal: Negative.    Skin: Negative.    Allergic/Immunologic: Negative.    Neurological: Negative.    Hematological: Negative.    Psychiatric/Behavioral: Negative.           Objective   /88   Pulse 84   Ht 5' 1.5\" (1.562 m)   Wt 133 kg (293 lb)   LMP 03/04/2025   SpO2 99%   BMI 54.47 kg/m²     Physical Exam  Vitals and nursing note reviewed.   Constitutional:       General: She is not in acute distress.     " Appearance: She is well-developed. She is not diaphoretic.   HENT:      Head: Normocephalic and atraumatic.      Right Ear: External ear normal.      Left Ear: External ear normal.      Nose: Nose normal.      Mouth/Throat:      Pharynx: No oropharyngeal exudate.   Eyes:      General: No scleral icterus.        Right eye: No discharge.         Left eye: No discharge.      Conjunctiva/sclera: Conjunctivae normal.      Pupils: Pupils are equal, round, and reactive to light.   Neck:      Thyroid: No thyromegaly.      Vascular: No JVD.      Trachea: No tracheal deviation.   Cardiovascular:      Rate and Rhythm: Normal rate and regular rhythm.      Heart sounds: Normal heart sounds. No murmur heard.     No friction rub. No gallop.   Pulmonary:      Effort: Pulmonary effort is normal. No respiratory distress.      Breath sounds: Normal breath sounds. No stridor. No wheezing or rales.   Chest:      Chest wall: No tenderness.   Abdominal:      General: Bowel sounds are normal. There is no distension.      Palpations: Abdomen is soft. There is no mass.      Tenderness: There is no abdominal tenderness. There is no guarding or rebound.      Hernia: No hernia is present.   Musculoskeletal:         General: No deformity. Normal range of motion.      Cervical back: Normal range of motion and neck supple.   Lymphadenopathy:      Cervical: No cervical adenopathy.   Skin:     General: Skin is warm and dry.      Capillary Refill: Capillary refill takes more than 3 seconds.      Findings: No rash.   Neurological:      Mental Status: She is alert and oriented to person, place, and time.      Motor: No abnormal muscle tone.      Coordination: Coordination normal.      Deep Tendon Reflexes: Reflexes normal.   Psychiatric:         Behavior: Behavior normal.         Thought Content: Thought content normal.         Judgment: Judgment normal.

## 2025-04-23 NOTE — PATIENT INSTRUCTIONS
"1. Annual physical exam  2. Mild intermittent asthmatic bronchitis without complication  3. Mixed hyperlipidemia  Assessment & Plan:  Patient is on no medication for her cholesterol her LDL is 130 which for her age and diagnoses is appropriate.  Low HDL at 46 increase activity.       4. Moderate persistent asthma with acute exacerbation  Assessment & Plan:  Patient should have Prevnar vaccination.       5. Morbid obesity (HCC)  Assessment & Plan:  Currently seeing weight management on semaglutide       6. Hyperglycemia  Assessment & Plan:  Fasting glucose 102 decrease simple carbohydrates including bread Posta potatoes rice junk for dessert and sweets. A1C was only 5.4.        7. Microcytic red blood cells  -     Iron Panel (Includes Ferritin, Iron Sat%, Iron, and TIBC)  8. Anxiety  Assessment & Plan:  Still doing well with the 150 mg of Zoloft.          Patient Education     Routine physical for adults   The Basics   Written by the doctors and editors at [facility]   What is a physical? -- A physical is a routine visit, or \"check-up,\" with your doctor. You might also hear it called a \"wellness visit\" or \"preventive visit.\"  During each visit, the doctor will:   Ask about your physical and mental health   Ask about your habits, behaviors, and lifestyle   Do an exam   Give you vaccines if needed   Talk to you about any medicines you take   Give advice about your health   Answer your questions  Getting regular check-ups is an important part of taking care of your health. It can help your doctor find and treat any problems you have. But it's also important for preventing health problems.  A routine physical is different from a \"sick visit.\" A sick visit is when you see a doctor because of a health concern or problem. Since physicals are scheduled ahead of time, you can think about what you want to ask the doctor.  How often should I get a physical? -- It depends on your age and health. In general, for people age 21 years " "and older:   If you are younger than 50 years, you might be able to get a physical every 3 years.   If you are 50 years or older, your doctor might recommend a physical every year.  If you have an ongoing health condition, like diabetes or high blood pressure, your doctor will probably want to see you more often.  What happens during a physical? -- In general, each visit will include:   Physical exam - The doctor or nurse will check your height, weight, heart rate, and blood pressure. They will also look at your eyes and ears. They will ask about how you are feeling and whether you have any symptoms that bother you.   Medicines - It's a good idea to bring a list of all the medicines you take to each doctor visit. Your doctor will talk to you about your medicines and answer any questions. Tell them if you are having any side effects that bother you. You should also tell them if you are having trouble paying for any of your medicines.   Habits and behaviors - This includes:   Your diet   Your exercise habits   Whether you smoke, drink alcohol, or use drugs   Whether you are sexually active   Whether you feel safe at home  Your doctor will talk to you about things you can do to improve your health and lower your risk of health problems. They will also offer help and support. For example, if you want to quit smoking, they can give you advice and might prescribe medicines. If you want to improve your diet or get more physical activity, they can help you with this, too.   Lab tests, if needed - The tests you get will depend on your age and situation. For example, your doctor might want to check your:   Cholesterol   Blood sugar   Iron level   Vaccines - The recommended vaccines will depend on your age, health, and what vaccines you already had. Vaccines are very important because they can prevent certain serious or deadly infections.   Discussion of screening - \"Screening\" means checking for diseases or other health " problems before they cause symptoms. Your doctor can recommend screening based on your age, risk, and preferences. This might include tests to check for:   Cancer, such as breast, prostate, cervical, ovarian, colorectal, prostate, lung, or skin cancer   Sexually transmitted infections, such as chlamydia and gonorrhea   Mental health conditions like depression and anxiety  Your doctor will talk to you about the different types of screening tests. They can help you decide which screenings to have. They can also explain what the results might mean.   Answering questions - The physical is a good time to ask the doctor or nurse questions about your health. If needed, they can refer you to other doctors or specialists, too.  Adults older than 65 years often need other care, too. As you get older, your doctor will talk to you about:   How to prevent falling at home   Hearing or vision tests   Memory testing   How to take your medicines safely   Making sure that you have the help and support you need at home  All topics are updated as new evidence becomes available and our peer review process is complete.  This topic retrieved from [employer] on: May 02, 2024.  Topic 749188 Version 1.0  Release: 32.4.3 - C32.122  © 2024 UpToDate, Inc. and/or its affiliates. All rights reserved.  Consumer Information Use and Disclaimer   Disclaimer: This generalized information is a limited summary of diagnosis, treatment, and/or medication information. It is not meant to be comprehensive and should be used as a tool to help the user understand and/or assess potential diagnostic and treatment options. It does NOT include all information about conditions, treatments, medications, side effects, or risks that may apply to a specific patient. It is not intended to be medical advice or a substitute for the medical advice, diagnosis, or treatment of a health care provider based on the health care provider's examination and assessment of a patient's  specific and unique circumstances. Patients must speak with a health care provider for complete information about their health, medical questions, and treatment options, including any risks or benefits regarding use of medications. This information does not endorse any treatments or medications as safe, effective, or approved for treating a specific patient. UpToDate, Inc. and its affiliates disclaim any warranty or liability relating to this information or the use thereof.The use of this information is governed by the Terms of Use, available at https://www.woltersScopelecuwer.com/en/know/clinical-effectiveness-terms. 2024© UpToDate, Inc. and its affiliates and/or licensors. All rights reserved.  Copyright   © 2024 Up[employer]Date, Inc. and/or its affiliates. All rights reserved.

## 2025-04-23 NOTE — ASSESSMENT & PLAN NOTE
Patient is on no medication for her cholesterol her LDL is 130 which for her age and diagnoses is appropriate.  Low HDL at 46 increase activity.

## 2025-04-29 ENCOUNTER — OFFICE VISIT (OUTPATIENT)
Dept: BARIATRICS | Facility: CLINIC | Age: 35
End: 2025-04-29
Payer: COMMERCIAL

## 2025-04-29 VITALS
RESPIRATION RATE: 16 BRPM | DIASTOLIC BLOOD PRESSURE: 84 MMHG | TEMPERATURE: 98.7 F | BODY MASS INDEX: 53.51 KG/M2 | WEIGHT: 290.8 LBS | SYSTOLIC BLOOD PRESSURE: 126 MMHG | HEIGHT: 62 IN | HEART RATE: 84 BPM

## 2025-04-29 DIAGNOSIS — E78.2 MIXED HYPERLIPIDEMIA: ICD-10-CM

## 2025-04-29 DIAGNOSIS — E66.01 MORBID OBESITY (HCC): Primary | ICD-10-CM

## 2025-04-29 PROCEDURE — 99214 OFFICE O/P EST MOD 30 MIN: CPT | Performed by: PHYSICIAN ASSISTANT

## 2025-04-29 RX ORDER — SEMAGLUTIDE 1 MG/.5ML
1 INJECTION, SOLUTION SUBCUTANEOUS WEEKLY
Qty: 2 ML | Refills: 3 | Status: SHIPPED | OUTPATIENT
Start: 2025-04-29 | End: 2025-08-19

## 2025-04-29 NOTE — ASSESSMENT & PLAN NOTE
-Patient is pursuing Conservative Program  -not interested in surgery  -Initial weight loss goal of 5-10% weight loss for improved health  -Screening labs and records reviewed from prior. A1c 3/27/24 5.7  - STOP BANG-3/8        Goals:  -Food log (ie.) www.[employer]2200  - To drink at least 64oz of water daily.No sugary beverages.    To continue on wegovy.  Doing well on the 1mg and had GI side effects on 1.7mg.  Has had 14% weight loss.  6lb from last visit  -33lb from December  Medication agreement signed 8/2024    Initial Weight:338.6lb  Current: 290.8lb  Change: -48.6lb  Goal Weight:250    She has been advised to maintain a daily water intake of 60 ounces and to continue reducing her soda consumption. She has also been encouraged to incorporate protein into her snacks and to aim for a daily protein intake of approximately 80 grams. If she experiences a plateau in weight loss or an increase in appetite, the dosage of Wegovy can be escalated to 1.7 mg, provided she tolerates it well. A prescription refill for Wegovy 1 mg has been issued for a duration of 4 months.

## 2025-04-29 NOTE — PROGRESS NOTES
Assessment/Plan:    Morbid obesity (HCC)  -Patient is pursuing Conservative Program  -not interested in surgery  -Initial weight loss goal of 5-10% weight loss for improved health  -Screening labs and records reviewed from prior. A1c 3/27/24 5.7  - STOP BANG-3/8        Goals:  -Food log (ie.) [email or web address]  - To drink at least 64oz of water daily.No sugary beverages.    To continue on wegovy.  Doing well on the 1mg and had GI side effects on 1.7mg.  Has had 14% weight loss.  6lb from last visit  -33lb from December  Medication agreement signed 8/2024    Initial Weight:338.6lb  Current: 290.8lb  Change: -48.6lb  Goal Weight:250    She has been advised to maintain a daily water intake of 60 ounces and to continue reducing her soda consumption. She has also been encouraged to incorporate protein into her snacks and to aim for a daily protein intake of approximately 80 grams. If she experiences a plateau in weight loss or an increase in appetite, the dosage of Wegovy can be escalated to 1.7 mg, provided she tolerates it well. A prescription refill for Wegovy 1 mg has been issued for a duration of 4 months.        Mixed hyperlipidemia  Lipids reviewed and to continue weight loss and activity.     Assessment & Plan          Return in about 3 months (around 7/29/2025).       Diagnoses and all orders for this visit:    Morbid obesity (HCC)  -     Semaglutide-Weight Management (Wegovy) 1 MG/0.5ML; Inject 0.5 mL (1 mg total) under the skin once a week    Mixed hyperlipidemia          Subjective:   Chief Complaint   Patient presents with    Follow-up     MWM F/u; Waist 54in        Patient ID: Neelima Mojica  is a 35 y.o. female with excess weight/obesity here to pursue weight managment.  Patient is pursuing Conservative Program.     HPI  History of Present Illness  The patient presents for evaluation of weight loss.    Weight Loss and Wegovy Response  She has been responding positively  to Wegovy, with the exception of an adverse reaction when the dosage was increased to 1.7 mg. This incident led to a temporary discontinuation of this dose , but she has since resumed the 1 mg dosage without further complications. Her appetite is well-regulated, and she has incorporated physical activity into her routine, walking her dog twice a week for approximately 30 minutes each session. She maintains a hydration level of 30 to 40 ounces of water daily and has reduced her consumption of other beverages, limiting herself to half a 20-ounce soda per day. She notes a decrease in thirst since starting the medication. Her dietary habits include brunch and dinner, with occasional snacking. Her typical brunch consists of low-fat yogurt or fruit, while dinner usually includes meat, potatoes, and vegetables. Her snacks are often salty, such as crackers and cheese. She does not track her food intake. She has observed a reduction in her portion sizes and reports no specific food triggers for nausea or other side effects. She had a consultation with her primary care physician last week, who provided her with a chart detailing her weight loss progress. She has recently renewed her prior authorization for Wegovy.    Exercise:Walking dog twice a week for 30 minutes each session;  Hydration level of 30 to 40 ounces of water daily; reduced consumption of other beverages.        Wt Readings from Last 10 Encounters:   04/29/25 132 kg (290 lb 12.8 oz)   04/23/25 133 kg (293 lb)   04/03/25 134 kg (296 lb 3.2 oz)   12/02/24 (!) 147 kg (323 lb 3.2 oz)   10/01/24 (!) 152 kg (334 lb 3.2 oz)   08/30/24 (!) 154 kg (338 lb 9.6 oz)   08/15/24 (!) 149 kg (329 lb 8 oz)   06/24/24 (!) 151 kg (332 lb)   04/01/24 (!) 150 kg (330 lb)   03/05/24 (!) 148 kg (326 lb)           The following portions of the patient's history were reviewed and updated as appropriate: She  has a past medical history of Allergic (01/2017), Anxiety, Asthma, Depression,  Headache(784.0), Obesity, Panic attack (01/2017), Sleep difficulties (01/2017), and Varicella.  She   Patient Active Problem List    Diagnosis Date Noted    Hyperglycemia 04/23/2025    Primary insomnia 01/30/2023    Asthma 01/17/2023    Mixed hyperlipidemia 12/03/2019    Morbid obesity (HCC) 03/05/2019    Anxiety 12/13/2016     She  has a past surgical history that includes Dental surgery (06/2007).  Her family history includes Anxiety disorder in her mother; Brain cancer in her maternal grandmother; Cancer in her maternal grandmother, maternal uncle, and maternal uncle; Colon cancer in her maternal grandfather and maternal uncle; Depression in her mother; Hypertension in her mother; Migraines in her mother; No Known Problems in her father.  She  reports that she has never smoked. She has never used smokeless tobacco. She reports current alcohol use of about 1.0 standard drink of alcohol per week. She reports that she does not use drugs.  Current Outpatient Medications   Medication Sig Dispense Refill    albuterol (PROVENTIL HFA,VENTOLIN HFA) 90 mcg/act inhaler Inhale 2 puffs every 4 (four) hours as needed for wheezing 6.7 g 11    ALPRAZolam (XANAX) 0.25 mg tablet TAKE 1 TABLET BY MOUTH DAILY AS NEEDED FOR ANXIETY 30 tablet 0    budesonide-formoterol (Symbicort) 80-4.5 MCG/ACT inhaler INHALE 2 PUFFS BY MOUTH 2 TIMES A DAY RINSE MOUTH AFTER USE. 10.2 g 3    fluticasone (FLONASE) 50 mcg/act nasal spray 1 spray into each nostril daily 11.1 mL 0    ipratropium-albuterol (DUO-NEB) 0.5-2.5 mg/3 mL nebulizer solution Take 3 mL by nebulization 4 (four) times a day 25 mL 1    norgestrel-ethinyl estradiol (Cryselle-28) 0.3 mg-30 mcg per tablet Take 1 tablet by mouth daily 84 tablet 4    ondansetron (ZOFRAN) 4 mg tablet TAKE 1 TABLET BY MOUTH EVERY 8 HOURS AS NEEDED FOR NAUSEA AND VOMITING 20 tablet 0    Semaglutide-Weight Management (Wegovy) 1 MG/0.5ML Inject 0.5 mL (1 mg total) under the skin once a week 2 mL 3    sertraline  (ZOLOFT) 100 mg tablet TAKE 1 TABLET BY MOUTH EVERY DAY 90 tablet 1    sertraline (ZOLOFT) 50 mg tablet TAKE 1 TABLET (50 MG TOTAL) BY MOUTH DAILY TAKE DAILY WITH 100 MG ZOLOFT TO EQUAL 150 MG DAILY. 90 tablet 1    Spacer/Aero-Holding Chambers (OptiChamber Alissa) MISC        No current facility-administered medications for this visit.     Current Outpatient Medications on File Prior to Visit   Medication Sig    albuterol (PROVENTIL HFA,VENTOLIN HFA) 90 mcg/act inhaler Inhale 2 puffs every 4 (four) hours as needed for wheezing    ALPRAZolam (XANAX) 0.25 mg tablet TAKE 1 TABLET BY MOUTH DAILY AS NEEDED FOR ANXIETY    budesonide-formoterol (Symbicort) 80-4.5 MCG/ACT inhaler INHALE 2 PUFFS BY MOUTH 2 TIMES A DAY RINSE MOUTH AFTER USE.    fluticasone (FLONASE) 50 mcg/act nasal spray 1 spray into each nostril daily    ipratropium-albuterol (DUO-NEB) 0.5-2.5 mg/3 mL nebulizer solution Take 3 mL by nebulization 4 (four) times a day    norgestrel-ethinyl estradiol (Cryselle-28) 0.3 mg-30 mcg per tablet Take 1 tablet by mouth daily    ondansetron (ZOFRAN) 4 mg tablet TAKE 1 TABLET BY MOUTH EVERY 8 HOURS AS NEEDED FOR NAUSEA AND VOMITING    sertraline (ZOLOFT) 100 mg tablet TAKE 1 TABLET BY MOUTH EVERY DAY    sertraline (ZOLOFT) 50 mg tablet TAKE 1 TABLET (50 MG TOTAL) BY MOUTH DAILY TAKE DAILY WITH 100 MG ZOLOFT TO EQUAL 150 MG DAILY.    Spacer/Aero-Holding Chambers (OptiChamber Alissa) MISC     [DISCONTINUED] Semaglutide-Weight Management (Wegovy) 1 MG/0.5ML INJECT 0.5 ML (1 MG TOTAL) UNDER THE SKIN ONCE A WEEK     No current facility-administered medications on file prior to visit.     She is allergic to rondec-d [chlophedianol-pseudoephedrine] and ashly tatum (corn pollen) allergy skin test..    Review of Systems   Constitutional:  Negative for fever.   Respiratory:  Negative for shortness of breath.    Cardiovascular:  Negative for chest pain and palpitations.   Gastrointestinal:  Negative for abdominal pain,  "constipation, diarrhea and vomiting.   Genitourinary:  Negative for difficulty urinating.   Skin:  Negative for rash.   Neurological:  Negative for headaches.   Psychiatric/Behavioral:  Negative for dysphoric mood. The patient is not nervous/anxious.        Objective:    /84 (BP Location: Left arm, Patient Position: Sitting)   Pulse 84   Temp 98.7 °F (37.1 °C) (Tympanic)   Resp 16   Ht 5' 1.5\" (1.562 m)   Wt 132 kg (290 lb 12.8 oz)   LMP 03/04/2025   BMI 54.06 kg/m²      Physical Exam  Vitals and nursing note reviewed.   Constitutional:       General: She is not in acute distress.     Appearance: She is well-developed. She is obese.   HENT:      Head: Normocephalic and atraumatic.   Eyes:      Conjunctiva/sclera: Conjunctivae normal.   Neck:      Thyroid: No thyromegaly.   Pulmonary:      Effort: Pulmonary effort is normal. No respiratory distress.   Skin:     Findings: No rash (visible).   Neurological:      Mental Status: She is alert and oriented to person, place, and time.   Psychiatric:         Behavior: Behavior normal.         "

## 2025-05-06 DIAGNOSIS — F41.9 ANXIETY: ICD-10-CM

## 2025-05-07 RX ORDER — SERTRALINE HYDROCHLORIDE 100 MG/1
100 TABLET, FILM COATED ORAL DAILY
Qty: 90 TABLET | Refills: 1 | Status: SHIPPED | OUTPATIENT
Start: 2025-05-07

## 2025-06-23 ENCOUNTER — OFFICE VISIT (OUTPATIENT)
Dept: OBGYN CLINIC | Facility: CLINIC | Age: 35
End: 2025-06-23
Payer: COMMERCIAL

## 2025-06-23 VITALS
WEIGHT: 285.8 LBS | HEIGHT: 62 IN | SYSTOLIC BLOOD PRESSURE: 120 MMHG | DIASTOLIC BLOOD PRESSURE: 80 MMHG | BODY MASS INDEX: 52.59 KG/M2

## 2025-06-23 DIAGNOSIS — N91.4 SECONDARY OLIGOMENORRHEA: ICD-10-CM

## 2025-06-23 DIAGNOSIS — Z01.419 WOMEN'S ANNUAL ROUTINE GYNECOLOGICAL EXAMINATION: Primary | ICD-10-CM

## 2025-06-23 PROCEDURE — S0612 ANNUAL GYNECOLOGICAL EXAMINA: HCPCS | Performed by: OBSTETRICS & GYNECOLOGY

## 2025-06-23 RX ORDER — NORGESTREL-ETHINYL ESTRADIOL 0.3-0.03MG
1 TABLET ORAL DAILY
Qty: 84 TABLET | Refills: 4 | Status: SHIPPED | OUTPATIENT
Start: 2025-06-23

## 2025-06-23 RX ORDER — SODIUM FLUORIDE 1.1 G/100G
CREAM ORAL
COMMUNITY
Start: 2025-06-05

## 2025-06-23 NOTE — PROGRESS NOTES
"Subjective      Neelima Mojica is a 35 y.o. female who presents for annual well woman exam.     GYN:  Denies vaginal discharge, labial erythema or lesions, dyspareunia.  Menses are regular, q 28 days, lasting 4 days.  Contraception: COCs.  Patient is sexually active with one male partner    OB:   female    :  Denies dysuria, urinary frequency or urgency.  Denies hematuria, flank pain, incontinence.  Denies constipation, diarrhea    Breast:  Denies breast mass, skin changes, dimpling, reddening, nipple retraction.  Denies breast discharge.  Patient does not have a family history of breast, endometrial, or ovarian ca.     General:  ETOH use: 1 drink/week  Tobacco use: no  Recreational drug use: no    Screening:  Cervical cancer: last pap smear in . Results were NILM/HPV neg.  STD screening: declines.    Review of Systems  Pertinent items are noted in HPI.      Objective      /80 (BP Location: Left arm, Patient Position: Sitting, Cuff Size: Large)   Ht 5' 1.5\" (1.562 m)   Wt 130 kg (285 lb 12.8 oz)   LMP 2025 (Exact Date)   BMI 53.13 kg/m²     Physical Exam  Constitutional:       Appearance: Normal appearance.   HENT:      Head: Normocephalic and atraumatic.     Cardiovascular:      Rate and Rhythm: Normal rate.   Pulmonary:      Effort: Pulmonary effort is normal. No respiratory distress.   Chest:   Breasts:     Right: Normal. No mass or tenderness.      Left: Normal. No mass or tenderness.   Abdominal:      Palpations: Abdomen is soft.      Tenderness: There is no abdominal tenderness.   Genitourinary:     General: Normal vulva.      Pubic Area: No rash.       Labia:         Right: No lesion.         Left: No lesion.       Urethra: No urethral lesion.      Vagina: No vaginal discharge or lesions.      Cervix: Normal. No lesion.      Uterus: Normal.       Adnexa: Right adnexa normal and left adnexa normal.        Right: No mass or tenderness.          Left: No mass or tenderness.        " Rectum: No external hemorrhoid.     Skin:     General: Skin is warm and dry.     Neurological:      Mental Status: She is alert.                 Assessment     34 yo presents today for her annual exam     Plan   - Next pap smear due 2027   - COCs refilled

## 2025-08-05 ENCOUNTER — OFFICE VISIT (OUTPATIENT)
Dept: BARIATRICS | Facility: CLINIC | Age: 35
End: 2025-08-05
Payer: COMMERCIAL

## 2025-08-05 VITALS
TEMPERATURE: 97.5 F | SYSTOLIC BLOOD PRESSURE: 124 MMHG | DIASTOLIC BLOOD PRESSURE: 74 MMHG | HEIGHT: 62 IN | RESPIRATION RATE: 16 BRPM | BODY MASS INDEX: 51.89 KG/M2 | WEIGHT: 282 LBS | HEART RATE: 93 BPM

## 2025-08-05 DIAGNOSIS — E78.2 MIXED HYPERLIPIDEMIA: Primary | ICD-10-CM

## 2025-08-05 DIAGNOSIS — R11.0 NAUSEA: ICD-10-CM

## 2025-08-05 DIAGNOSIS — E66.01 MORBID OBESITY (HCC): ICD-10-CM

## 2025-08-05 PROCEDURE — 99214 OFFICE O/P EST MOD 30 MIN: CPT | Performed by: PHYSICIAN ASSISTANT

## 2025-08-05 RX ORDER — SEMAGLUTIDE 1.7 MG/.75ML
1.7 INJECTION, SOLUTION SUBCUTANEOUS WEEKLY
COMMUNITY

## 2025-08-05 RX ORDER — ONDANSETRON 4 MG/1
4 TABLET, FILM COATED ORAL EVERY 8 HOURS PRN
Qty: 30 TABLET | Refills: 0 | Status: SHIPPED | OUTPATIENT
Start: 2025-08-05